# Patient Record
Sex: FEMALE | Race: WHITE | Employment: OTHER | URBAN - METROPOLITAN AREA
[De-identification: names, ages, dates, MRNs, and addresses within clinical notes are randomized per-mention and may not be internally consistent; named-entity substitution may affect disease eponyms.]

---

## 2017-06-06 ENCOUNTER — TELEPHONE (OUTPATIENT)
Dept: INTERNAL MEDICINE CLINIC | Facility: CLINIC | Age: 47
End: 2017-06-06

## 2017-06-06 DIAGNOSIS — D23.9 MULTIPLE DYSPLASTIC NEVI: Primary | ICD-10-CM

## 2017-06-06 NOTE — TELEPHONE ENCOUNTER
Patient now has a HMO & needs a referral to see Dr. Tabitha Clifton for her skin issues.   (The insurance is in the system, but card has not been scanned yet)

## 2017-06-06 NOTE — TELEPHONE ENCOUNTER
Pt states she is getting a skin check for dysplastic nevi, she had moles removes last year.  Pt states she has appt with Dr. Audrey Dang in August    To Dr. Vidal Damon, referral pending

## 2017-06-12 NOTE — TELEPHONE ENCOUNTER
Voicemail left informing patient that referral has been entered. VM left per pt preference as listed in Epic. Encouraged her to call back with any additional questions or concerns. Nothing further at this time.

## 2017-08-01 ENCOUNTER — OFFICE VISIT (OUTPATIENT)
Dept: DERMATOLOGY CLINIC | Facility: CLINIC | Age: 47
End: 2017-08-01

## 2017-08-01 DIAGNOSIS — D23.70 BENIGN NEOPLASM OF SKIN OF LOWER LIMB, INCLUDING HIP, UNSPECIFIED LATERALITY: ICD-10-CM

## 2017-08-01 DIAGNOSIS — L82.1 SEBORRHEIC KERATOSES: ICD-10-CM

## 2017-08-01 DIAGNOSIS — D23.5 BENIGN NEOPLASM OF SKIN OF TRUNK, EXCEPT SCROTUM: ICD-10-CM

## 2017-08-01 DIAGNOSIS — D23.4 BENIGN NEOPLASM OF SCALP AND SKIN OF NECK: ICD-10-CM

## 2017-08-01 DIAGNOSIS — L81.4 SOLAR LENTIGO: ICD-10-CM

## 2017-08-01 DIAGNOSIS — Z86.018 HISTORY OF DYSPLASTIC NEVUS: ICD-10-CM

## 2017-08-01 DIAGNOSIS — D48.5 NEOPLASM OF UNCERTAIN BEHAVIOR OF SKIN: Primary | ICD-10-CM

## 2017-08-01 DIAGNOSIS — D23.30 BENIGN NEOPLASM OF SKIN OF FACE: ICD-10-CM

## 2017-08-01 DIAGNOSIS — D23.60 BENIGN NEOPLASM OF SKIN OF UPPER LIMB, INCLUDING SHOULDER, UNSPECIFIED LATERALITY: ICD-10-CM

## 2017-08-01 PROCEDURE — 11101 BIOPSY, EACH ADDED LESION: CPT | Performed by: DERMATOLOGY

## 2017-08-01 PROCEDURE — 11100 BIOPSY OF SKIN LESION: CPT | Performed by: DERMATOLOGY

## 2017-08-01 PROCEDURE — 88305 TISSUE EXAM BY PATHOLOGIST: CPT | Performed by: DERMATOLOGY

## 2017-08-01 PROCEDURE — 99213 OFFICE O/P EST LOW 20 MIN: CPT | Performed by: DERMATOLOGY

## 2017-08-01 PROCEDURE — 99212 OFFICE O/P EST SF 10 MIN: CPT | Performed by: DERMATOLOGY

## 2017-08-01 NOTE — PROCEDURES
Procedural Report for Shave Biopsy    Procedure: With the patient is a l lateral decub position, the skin was scrubbed with alcohol. Anesthesia was obtained by injecting 2 mL of 1% Xylocaine with Epinephrine.   The skin surrounding the lession was place

## 2017-08-01 NOTE — PROGRESS NOTES
HPI:     Chief Complaint     Derm Problem        HPI     Derm Problem    Additional comments: Patient presents for full body check. LOV 3/3/2016. New lesions appearing. Denies bleeding. No personal hx of skin ca.  Father with hx of [address]       Last edited by name: N/A    Years of education: N/A  Number of children: N/A     Occupational History  None on file     Social History Main Topics   Smoking status: Never Smoker    Smokeless tobacco: Not on file    Alcohol use Yes    Comment: 2 drinks weekly    Drug use: are  performed- See the operative note. Postop instructions given. Further plan pending pathology. Monroe Omer History of dysplastic nevus-patient will continue  yearly exams.   Will come sooner if either of the above lesions are atypical  Solar lentigo-proper use

## 2017-11-07 ENCOUNTER — OFFICE VISIT (OUTPATIENT)
Dept: INTERNAL MEDICINE CLINIC | Facility: CLINIC | Age: 47
End: 2017-11-07

## 2017-11-07 VITALS
HEART RATE: 88 BPM | WEIGHT: 123.38 LBS | DIASTOLIC BLOOD PRESSURE: 76 MMHG | OXYGEN SATURATION: 99 % | HEIGHT: 62.5 IN | SYSTOLIC BLOOD PRESSURE: 110 MMHG | BODY MASS INDEX: 22.14 KG/M2 | TEMPERATURE: 98 F

## 2017-11-07 DIAGNOSIS — R53.83 OTHER FATIGUE: ICD-10-CM

## 2017-11-07 DIAGNOSIS — Z12.31 ENCOUNTER FOR SCREENING MAMMOGRAM FOR BREAST CANCER: ICD-10-CM

## 2017-11-07 DIAGNOSIS — E78.00 HYPERCHOLESTEROLEMIA: Primary | ICD-10-CM

## 2017-11-07 DIAGNOSIS — E55.9 VITAMIN D DEFICIENCY: ICD-10-CM

## 2017-11-07 DIAGNOSIS — R92.2 DENSE BREASTS: ICD-10-CM

## 2017-11-07 DIAGNOSIS — M85.80 OSTEOPENIA, UNSPECIFIED LOCATION: ICD-10-CM

## 2017-11-07 DIAGNOSIS — Z00.00 ROUTINE HEALTH MAINTENANCE: ICD-10-CM

## 2017-11-07 PROCEDURE — 90686 IIV4 VACC NO PRSV 0.5 ML IM: CPT | Performed by: INTERNAL MEDICINE

## 2017-11-07 PROCEDURE — 90471 IMMUNIZATION ADMIN: CPT | Performed by: INTERNAL MEDICINE

## 2017-11-07 PROCEDURE — 99396 PREV VISIT EST AGE 40-64: CPT | Performed by: INTERNAL MEDICINE

## 2017-11-07 NOTE — PROGRESS NOTES
Rogers Shown is a 50year old female. Patient presents with:  Physical: Patient presents for annual physical. Last pap done 10/2016. Last mammogram 12/2016.        HPI:   Rogers Shown is a 50year old female who presents for a complete physical exam.   F vomiting, diarrhea, constipation, hematochezia, or melena  NEURO: denies headaches or dizziness    EXAM:   /76 (BP Location: Left arm, Patient Position: Sitting, Cuff Size: adult)   Pulse 88   Temp 98.1 °F (36.7 °C) (Oral)   Ht 5' 2.5\" (1.588 m)   W

## 2017-11-17 ENCOUNTER — LAB ENCOUNTER (OUTPATIENT)
Dept: LAB | Age: 47
End: 2017-11-17
Attending: INTERNAL MEDICINE
Payer: COMMERCIAL

## 2017-11-17 DIAGNOSIS — E78.00 HYPERCHOLESTEROLEMIA: ICD-10-CM

## 2017-11-17 DIAGNOSIS — R53.83 OTHER FATIGUE: ICD-10-CM

## 2017-11-17 DIAGNOSIS — E55.9 VITAMIN D DEFICIENCY: ICD-10-CM

## 2017-11-17 PROCEDURE — 82306 VITAMIN D 25 HYDROXY: CPT

## 2017-11-17 PROCEDURE — 80061 LIPID PANEL: CPT

## 2017-11-17 PROCEDURE — 85025 COMPLETE CBC W/AUTO DIFF WBC: CPT

## 2017-11-17 PROCEDURE — 80053 COMPREHEN METABOLIC PANEL: CPT

## 2017-11-17 PROCEDURE — 36415 COLL VENOUS BLD VENIPUNCTURE: CPT

## 2017-11-17 PROCEDURE — 84443 ASSAY THYROID STIM HORMONE: CPT

## 2017-12-20 ENCOUNTER — HOSPITAL ENCOUNTER (OUTPATIENT)
Dept: MAMMOGRAPHY | Age: 47
Discharge: HOME | End: 2017-12-20
Attending: INTERNAL MEDICINE
Payer: COMMERCIAL

## 2017-12-20 ENCOUNTER — HOSPITAL ENCOUNTER (OUTPATIENT)
Dept: BONE DENSITY | Age: 47
Discharge: HOME | End: 2017-12-20
Attending: INTERNAL MEDICINE
Payer: COMMERCIAL

## 2017-12-20 DIAGNOSIS — M85.80 OSTEOPENIA, UNSPECIFIED LOCATION: ICD-10-CM

## 2017-12-20 DIAGNOSIS — R92.2 DENSE BREASTS: ICD-10-CM

## 2017-12-20 DIAGNOSIS — Z12.31 ENCOUNTER FOR SCREENING MAMMOGRAM FOR BREAST CANCER: ICD-10-CM

## 2017-12-20 PROCEDURE — 77067 SCR MAMMO BI INCL CAD: CPT | Performed by: INTERNAL MEDICINE

## 2017-12-20 PROCEDURE — 77063 BREAST TOMOSYNTHESIS BI: CPT | Performed by: INTERNAL MEDICINE

## 2017-12-20 PROCEDURE — 77080 DXA BONE DENSITY AXIAL: CPT | Performed by: INTERNAL MEDICINE

## 2017-12-26 ENCOUNTER — TELEPHONE (OUTPATIENT)
Dept: INTERNAL MEDICINE CLINIC | Facility: CLINIC | Age: 47
End: 2017-12-26

## 2017-12-26 NOTE — TELEPHONE ENCOUNTER
I don't think she needs an actual referral.  I ordered the additional mammo so that should be all she needs

## 2017-12-26 NOTE — TELEPHONE ENCOUNTER
Spoke with Evan Elliott in [facility]. She reports patient does not need a referral for additional views with mammo. She states she just needs an order and this is usually covered under the original mammo order. Patient notified. She verbalized understanding.

## 2017-12-26 NOTE — TELEPHONE ENCOUNTER
Pt had a mammogram on 12/20 she now needs a diagnostic mammogram which is scheduled 12/28, pt said she spoke to her Ins today she needs a referral she has [facility]     Please call pt when complete [phone]   Tasked to nursing high

## 2017-12-26 NOTE — TELEPHONE ENCOUNTER
PT is calling back she called her Ins again she needs a written referral & to include U/S to cover all bases.     Tasked to nursing

## 2017-12-28 ENCOUNTER — HOSPITAL ENCOUNTER (OUTPATIENT)
Dept: MAMMOGRAPHY | Facility: HOSPITAL | Age: 47
Discharge: HOME | End: 2017-12-28
Attending: INTERNAL MEDICINE
Payer: COMMERCIAL

## 2017-12-28 ENCOUNTER — HOSPITAL ENCOUNTER (OUTPATIENT)
Dept: ULTRASOUND IMAGING | Facility: HOSPITAL | Age: 47
Discharge: HOME | End: 2017-12-28
Attending: INTERNAL MEDICINE
Payer: COMMERCIAL

## 2017-12-28 DIAGNOSIS — R92.8 ABNORMAL MAMMOGRAM: ICD-10-CM

## 2017-12-28 PROCEDURE — 77065 DX MAMMO INCL CAD UNI: CPT | Performed by: INTERNAL MEDICINE

## 2017-12-28 PROCEDURE — 77061 BREAST TOMOSYNTHESIS UNI: CPT | Performed by: INTERNAL MEDICINE

## 2017-12-28 PROCEDURE — 76642 ULTRASOUND BREAST LIMITED: CPT | Performed by: INTERNAL MEDICINE

## 2018-07-23 ENCOUNTER — PATIENT MESSAGE (OUTPATIENT)
Dept: INTERNAL MEDICINE CLINIC | Facility: CLINIC | Age: 48
End: 2018-07-23

## 2018-07-23 DIAGNOSIS — D23.9 DYSPLASTIC NEVUS: Primary | ICD-10-CM

## 2018-07-23 NOTE — TELEPHONE ENCOUNTER
From: Lorenza Ceballos  To: Keron Vazquez MD  Sent: 2018 2:31 PM CDT  Subject: Referral Request    Hi Dr. Andreina Henderson,  I was not able to schedule my annual dermatology skin check with Dr. Zeb Khan before your referral .  I think I need that to s

## 2018-11-13 ENCOUNTER — OFFICE VISIT (OUTPATIENT)
Dept: INTERNAL MEDICINE CLINIC | Facility: CLINIC | Age: 48
End: 2018-11-13
Payer: COMMERCIAL

## 2018-11-13 VITALS
TEMPERATURE: 98 F | WEIGHT: 121 LBS | BODY MASS INDEX: 21.71 KG/M2 | RESPIRATION RATE: 18 BRPM | HEIGHT: 62.5 IN | OXYGEN SATURATION: 99 % | DIASTOLIC BLOOD PRESSURE: 62 MMHG | HEART RATE: 78 BPM | SYSTOLIC BLOOD PRESSURE: 102 MMHG

## 2018-11-13 DIAGNOSIS — M85.80 OSTEOPENIA, UNSPECIFIED LOCATION: ICD-10-CM

## 2018-11-13 DIAGNOSIS — Z12.31 ENCOUNTER FOR SCREENING MAMMOGRAM FOR BREAST CANCER: ICD-10-CM

## 2018-11-13 DIAGNOSIS — Z12.83 SCREENING EXAM FOR SKIN CANCER: ICD-10-CM

## 2018-11-13 DIAGNOSIS — Z00.00 ROUTINE HEALTH MAINTENANCE: ICD-10-CM

## 2018-11-13 DIAGNOSIS — R92.2 DENSE BREASTS: ICD-10-CM

## 2018-11-13 DIAGNOSIS — E78.00 HYPERCHOLESTEROLEMIA: Primary | ICD-10-CM

## 2018-11-13 DIAGNOSIS — R53.83 OTHER FATIGUE: ICD-10-CM

## 2018-11-13 PROCEDURE — 90471 IMMUNIZATION ADMIN: CPT | Performed by: INTERNAL MEDICINE

## 2018-11-13 PROCEDURE — 99396 PREV VISIT EST AGE 40-64: CPT | Performed by: INTERNAL MEDICINE

## 2018-11-13 PROCEDURE — 90686 IIV4 VACC NO PRSV 0.5 ML IM: CPT | Performed by: INTERNAL MEDICINE

## 2018-11-13 NOTE — PROGRESS NOTES
Gianluca Dotson is a 46year old female. Patient presents with: Annual: no pap, no complaints      HPI:   Gianluca Dotson is a 46year old female who presents for a complete physical exam.   Feels well. Exercises 4x/week. Does TRX, body pump.   Sons are 16 or dizziness    EXAM:   /62   Pulse 78   Temp 97.6 °F (36.4 °C) (Oral)   Resp 18   Ht 5' 2.5\" (1.588 m)   Wt 121 lb (54.9 kg)   LMP 05/27/2018   SpO2 99%   BMI 21.78 kg/m²     GENERAL: well developed, well nourished, in no apparent distress  HEENT:

## 2019-11-12 ENCOUNTER — OFFICE VISIT (OUTPATIENT)
Dept: INTERNAL MEDICINE CLINIC | Facility: CLINIC | Age: 49
End: 2019-11-12
Payer: COMMERCIAL

## 2019-11-12 VITALS
HEIGHT: 62.7 IN | DIASTOLIC BLOOD PRESSURE: 68 MMHG | OXYGEN SATURATION: 100 % | BODY MASS INDEX: 22.39 KG/M2 | TEMPERATURE: 98 F | SYSTOLIC BLOOD PRESSURE: 122 MMHG | WEIGHT: 124.81 LBS | HEART RATE: 70 BPM

## 2019-11-12 DIAGNOSIS — R92.2 DENSE BREASTS: ICD-10-CM

## 2019-11-12 DIAGNOSIS — E78.00 HYPERCHOLESTEROLEMIA: ICD-10-CM

## 2019-11-12 DIAGNOSIS — R53.83 OTHER FATIGUE: ICD-10-CM

## 2019-11-12 DIAGNOSIS — E55.9 VITAMIN D DEFICIENCY: ICD-10-CM

## 2019-11-12 DIAGNOSIS — Z12.31 ENCOUNTER FOR SCREENING MAMMOGRAM FOR BREAST CANCER: Primary | ICD-10-CM

## 2019-11-12 DIAGNOSIS — Z00.00 ROUTINE HEALTH MAINTENANCE: ICD-10-CM

## 2019-11-12 DIAGNOSIS — M85.80 OSTEOPENIA, UNSPECIFIED LOCATION: ICD-10-CM

## 2019-11-12 PROCEDURE — 90686 IIV4 VACC NO PRSV 0.5 ML IM: CPT | Performed by: INTERNAL MEDICINE

## 2019-11-12 PROCEDURE — 90471 IMMUNIZATION ADMIN: CPT | Performed by: INTERNAL MEDICINE

## 2019-11-12 PROCEDURE — 99396 PREV VISIT EST AGE 40-64: CPT | Performed by: INTERNAL MEDICINE

## 2019-11-12 NOTE — PROGRESS NOTES
Lupis Murray is a 44year old female. Patient presents with:  Physical: Feels well. Flu shot given. HPI:   Lupis Murray is a 44year old female who presents for a complete physical exam.   Feels well. Exercises 4x/week. Does yoga, body pump.   So vomiting, diarrhea, constipation, hematochezia, or melena  NEURO: denies headaches or dizziness    EXAM:   /68 (BP Location: Left arm, Patient Position: Sitting, Cuff Size: adult)   Pulse 70   Temp 97.6 °F (36.4 °C) (Oral)   Ht 5' 2.7\" (1.593 m)   W

## 2020-06-05 ENCOUNTER — PATIENT MESSAGE (OUTPATIENT)
Dept: INTERNAL MEDICINE CLINIC | Facility: CLINIC | Age: 50
End: 2020-06-05

## 2020-06-05 DIAGNOSIS — Z12.39 SCREENING FOR BREAST CANCER: Primary | ICD-10-CM

## 2020-06-05 NOTE — TELEPHONE ENCOUNTER
Mammogram order entered per protocol .  [facility] message sent to patient

## 2020-06-05 NOTE — TELEPHONE ENCOUNTER
From: Paulina Offer  To: Sunny Licea MD  Sent: 6/5/2020 11:06 AM CDT  Subject: Referral Request    Do I need an updated referral/order for a mammogram screening?  I have a mammogram appointment at the [facility] on July 15th and they asked if I

## 2020-06-08 ENCOUNTER — TELEPHONE (OUTPATIENT)
Dept: DERMATOLOGY CLINIC | Facility: CLINIC | Age: 50
End: 2020-06-08

## 2020-06-08 NOTE — TELEPHONE ENCOUNTER
Patient asking to be seen sooner than 6/29. Appt is for full body check. Patient asking to be seen sooner for a spot of concern.  Please advsie

## 2020-06-08 NOTE — TELEPHONE ENCOUNTER
LOV 2017 - Pt states a mole has been changing in color (black spots in middle) and now getting raised and thicker. Pt states she picked off the top of it and it is now scabbing. Pt asking to get in sooner.   Pt added to LSS' schedule on 6/9/2020 at 8am.

## 2020-06-09 ENCOUNTER — OFFICE VISIT (OUTPATIENT)
Dept: DERMATOLOGY CLINIC | Facility: CLINIC | Age: 50
End: 2020-06-09
Payer: COMMERCIAL

## 2020-06-09 VITALS — WEIGHT: 120 LBS | BODY MASS INDEX: 21.26 KG/M2 | HEIGHT: 63 IN

## 2020-06-09 DIAGNOSIS — D48.5 NEOPLASM OF UNCERTAIN BEHAVIOR OF SKIN: Primary | ICD-10-CM

## 2020-06-09 PROCEDURE — 11104 PUNCH BX SKIN SINGLE LESION: CPT | Performed by: DERMATOLOGY

## 2020-06-09 PROCEDURE — 88305 TISSUE EXAM BY PATHOLOGIST: CPT | Performed by: DERMATOLOGY

## 2020-06-09 PROCEDURE — 99212 OFFICE O/P EST SF 10 MIN: CPT | Performed by: DERMATOLOGY

## 2020-06-09 NOTE — PROGRESS NOTES
HPI:     Chief Complaint     Moles        HPI     Moles      Additional comments: changing nevus on right upper thigh ,denies itch ,bleed ,or pain ,no personal HX ,father has a HX of [address]          Last edited by Roman Richardson [address] [place] [address] Ave on 6/9/2020  8:04 AM. (H Non-medical: Not on file    Tobacco Use      Smoking status: Never Smoker      Smokeless tobacco: Never Used    Substance and Sexual Activity      Alcohol use:  Yes        Alcohol/week: 2.0 standard drinks        Types: 2 Standard drinks or equivalent p alert and oriented and appears their stated age. They are well-nourished. Exam is remarkable for the following-  1. At this point there is just a very small scab on right thigh. Minimal surrounding erythema. No appreciable pattern on dermoscopy.   2.

## 2020-06-09 NOTE — PROCEDURES
Procedural Report for Punch Biopsy    Procedure: With the patient is a supine position, the skin was scrubbed with alcohol. Anesthesia was obtained by injecting 2 mL of 1% Xylocaine with Epinephrine. A circular punch, 5 mm.  In size was used to incise

## 2020-06-18 ENCOUNTER — NURSE ONLY (OUTPATIENT)
Dept: DERMATOLOGY CLINIC | Facility: CLINIC | Age: 50
End: 2020-06-18
Payer: COMMERCIAL

## 2020-06-18 DIAGNOSIS — Z48.02 VISIT FOR SUTURE REMOVAL: Primary | ICD-10-CM

## 2020-06-18 NOTE — PROGRESS NOTES
HPI:     Chief Complaint     Suture Removal        HPI     Suture Removal      Additional comments: pt is here for stitch removal on RT leg, no personal HX of SC, father HX of [address]           Last edited by Aaron Turcios [address] [place] [address] Miryam on 6/18/2020 10:29 AM. (History) Standard drinks or equivalent per week        Comment: 2 drinks weekly      Drug use: No      Sexual activity: Not on file    Lifestyle      Physical activity:        Days per week: Not on file        Minutes per session: Not on file      Stress: Not on fi approximated. ASSESSMENT/PLAN:   Visit for suture removal  (primary encounter diagnosis) diagnosis discussed. Benign nature discussed. Suture removed Steri-Strips applied.   Patient will follow-up for her regularly scheduled skin check in a couple we

## 2020-06-29 ENCOUNTER — OFFICE VISIT (OUTPATIENT)
Dept: DERMATOLOGY CLINIC | Facility: CLINIC | Age: 50
End: 2020-06-29
Payer: COMMERCIAL

## 2020-06-29 DIAGNOSIS — D23.5 BENIGN NEOPLASM OF SKIN OF TRUNK, EXCEPT SCROTUM: ICD-10-CM

## 2020-06-29 DIAGNOSIS — Z86.018 HISTORY OF DYSPLASTIC NEVUS: Primary | ICD-10-CM

## 2020-06-29 DIAGNOSIS — D23.70 BENIGN NEOPLASM OF SKIN OF LOWER EXTREMITY, INCLUDING HIP, UNSPECIFIED LATERALITY: ICD-10-CM

## 2020-06-29 DIAGNOSIS — D23.4 BENIGN NEOPLASM OF SCALP AND SKIN OF NECK: ICD-10-CM

## 2020-06-29 DIAGNOSIS — L82.1 SEBORRHEIC KERATOSES: ICD-10-CM

## 2020-06-29 DIAGNOSIS — L81.4 SOLAR LENTIGO: ICD-10-CM

## 2020-06-29 DIAGNOSIS — D23.30 BENIGN NEOPLASM OF SKIN OF FACE: ICD-10-CM

## 2020-06-29 DIAGNOSIS — D23.60 BENIGN NEOPLASM OF SKIN OF UPPER EXTREMITY AND SHOULDER, UNSPECIFIED LATERALITY: ICD-10-CM

## 2020-06-29 PROCEDURE — 99213 OFFICE O/P EST LOW 20 MIN: CPT | Performed by: DERMATOLOGY

## 2020-06-29 NOTE — PROGRESS NOTES
HPI:     Chief Complaint     Full Skin Exam        HPI     Full Skin Exam      Additional comments: LOV 6/9/2020 - Pt presenting for full body skin exam.  Pt denies personal hx of skin cancer. Pt denies family hx of skin cancer.           Last edited by Mitch Kanner dysplasia   • Elevated LDL cholesterol level      History reviewed. No pertinent surgical history.   Social History    Socioeconomic History      Marital status:       Spouse name: Not on file      Number of children: Not on file      Years of educat Exercise: Not Asked        Bike Helmet: Not Asked        Seat Belt: Not Asked        Self-Exams: Not Asked        Grew up on a farm: Not Asked        History of tanning: Not Asked        Outdoor occupation: Not Asked        Pt has a pacemaker: No hours.     Benign neoplasm of skin of face  Benign neoplasm of scalp and skin of neck  Benign neoplasm of skin of trunk, except scrotum  Benign neoplasm of skin of upper extremity and shoulder, unspecified laterality  Benign neoplasm of skin of lower extrem

## 2020-07-15 ENCOUNTER — HOSPITAL ENCOUNTER (OUTPATIENT)
Dept: MAMMOGRAPHY | Facility: HOSPITAL | Age: 50
Discharge: HOME | End: 2020-07-15
Attending: INTERNAL MEDICINE
Payer: COMMERCIAL

## 2020-07-15 DIAGNOSIS — R92.2 DENSE BREASTS: ICD-10-CM

## 2020-07-15 DIAGNOSIS — Z12.31 ENCOUNTER FOR SCREENING MAMMOGRAM FOR BREAST CANCER: ICD-10-CM

## 2020-07-15 PROCEDURE — 77063 BREAST TOMOSYNTHESIS BI: CPT | Performed by: INTERNAL MEDICINE

## 2020-07-15 PROCEDURE — 77067 SCR MAMMO BI INCL CAD: CPT | Performed by: INTERNAL MEDICINE

## 2021-04-08 ENCOUNTER — OFFICE VISIT (OUTPATIENT)
Dept: INTERNAL MEDICINE CLINIC | Facility: CLINIC | Age: 51
End: 2021-04-08
Payer: COMMERCIAL

## 2021-04-08 VITALS
HEART RATE: 76 BPM | TEMPERATURE: 98 F | BODY MASS INDEX: 21.26 KG/M2 | HEIGHT: 63 IN | WEIGHT: 120 LBS | DIASTOLIC BLOOD PRESSURE: 78 MMHG | SYSTOLIC BLOOD PRESSURE: 112 MMHG | OXYGEN SATURATION: 98 %

## 2021-04-08 DIAGNOSIS — Z00.00 ROUTINE HEALTH MAINTENANCE: ICD-10-CM

## 2021-04-08 DIAGNOSIS — Z12.4 CERVICAL CANCER SCREENING: ICD-10-CM

## 2021-04-08 DIAGNOSIS — E55.9 VITAMIN D DEFICIENCY: ICD-10-CM

## 2021-04-08 DIAGNOSIS — R53.83 OTHER FATIGUE: ICD-10-CM

## 2021-04-08 DIAGNOSIS — M85.80 OSTEOPENIA, UNSPECIFIED LOCATION: ICD-10-CM

## 2021-04-08 DIAGNOSIS — Z12.31 ENCOUNTER FOR SCREENING MAMMOGRAM FOR MALIGNANT NEOPLASM OF BREAST: Primary | ICD-10-CM

## 2021-04-08 DIAGNOSIS — E78.00 HYPERCHOLESTEROLEMIA: ICD-10-CM

## 2021-04-08 DIAGNOSIS — Z78.0 POSTMENOPAUSE: ICD-10-CM

## 2021-04-08 PROCEDURE — 3008F BODY MASS INDEX DOCD: CPT | Performed by: INTERNAL MEDICINE

## 2021-04-08 PROCEDURE — 99396 PREV VISIT EST AGE 40-64: CPT | Performed by: INTERNAL MEDICINE

## 2021-04-08 PROCEDURE — 3074F SYST BP LT 130 MM HG: CPT | Performed by: INTERNAL MEDICINE

## 2021-04-08 PROCEDURE — 3078F DIAST BP <80 MM HG: CPT | Performed by: INTERNAL MEDICINE

## 2021-04-08 RX ORDER — FLUTICASONE PROPIONATE 50 MCG
2 SPRAY, SUSPENSION (ML) NASAL DAILY
COMMUNITY
End: 2022-01-17 | Stop reason: ALTCHOICE

## 2021-04-08 NOTE — PROGRESS NOTES
Zia Shah is a 44year old female. Patient presents with:  Physical: annual physical exam, requesting pap-last done 2016      HPI:   Zia Shah is a 44year old female who presents for a complete physical exam.       Had Covid (Portillo Dunham) vaccine 2 we Family History   Problem Relation Age of Onset   • Ovarian Cancer Paternal Grandmother 72   • Arrhythmia Son       Social History:   Social History    Tobacco Use      Smoking status: Never Smoker      Smokeless tobacco: Never Used    Vaping Use      Vap Colonoscopy 7/6/20 (Dr. Ramiro Gregorio); no polyps; repeat in 10 years (2030). Had Shingrix shingles vaccine.       Osteopenia  DEXA done 12/2017 -- stable, mild osteopenia. Cont exercise, cont calcium and vit D.  Check repeat (did not do when ordered in 2019)

## 2021-08-20 ENCOUNTER — TELEPHONE (OUTPATIENT)
Dept: INTERNAL MEDICINE CLINIC | Facility: CLINIC | Age: 51
End: 2021-08-20

## 2021-08-20 ENCOUNTER — OFFICE VISIT (OUTPATIENT)
Dept: INTERNAL MEDICINE CLINIC | Facility: CLINIC | Age: 51
End: 2021-08-20
Payer: COMMERCIAL

## 2021-08-20 VITALS
TEMPERATURE: 98 F | OXYGEN SATURATION: 98 % | HEART RATE: 59 BPM | BODY MASS INDEX: 22.26 KG/M2 | RESPIRATION RATE: 12 BRPM | WEIGHT: 121 LBS | HEIGHT: 62 IN | SYSTOLIC BLOOD PRESSURE: 116 MMHG | DIASTOLIC BLOOD PRESSURE: 58 MMHG

## 2021-08-20 DIAGNOSIS — L02.01 FACIAL ABSCESS: ICD-10-CM

## 2021-08-20 DIAGNOSIS — L03.211 FACIAL CELLULITIS: Primary | ICD-10-CM

## 2021-08-20 PROCEDURE — 3008F BODY MASS INDEX DOCD: CPT | Performed by: INTERNAL MEDICINE

## 2021-08-20 PROCEDURE — 3078F DIAST BP <80 MM HG: CPT | Performed by: INTERNAL MEDICINE

## 2021-08-20 PROCEDURE — 3074F SYST BP LT 130 MM HG: CPT | Performed by: INTERNAL MEDICINE

## 2021-08-20 PROCEDURE — 99214 OFFICE O/P EST MOD 30 MIN: CPT | Performed by: INTERNAL MEDICINE

## 2021-08-20 RX ORDER — CEFUROXIME AXETIL 500 MG/1
500 TABLET ORAL 2 TIMES DAILY
Qty: 14 TABLET | Refills: 0 | Status: SHIPPED | OUTPATIENT
Start: 2021-08-20 | End: 2022-01-17 | Stop reason: ALTCHOICE

## 2021-08-20 NOTE — PROGRESS NOTES
HPI:   Zia Shah is a 46year old female who presents for complains of: Patient presents with:  Eyelid Swelling: Lt Eye edema and redness, 2 weeks ago, noted it and then mechanically manipulated then progressed to soreness.   does not effect or obstruc

## 2021-08-20 NOTE — PATIENT INSTRUCTIONS
1. Facial cellulitis  Lets try to use the warm compress tonight for a solid 30 to 45 minutes as discussed  Start the antibiotics when you get them this afternoon, try to take both doses in 1 given day, should be split by at least 6 hours take with food if

## 2021-08-20 NOTE — TELEPHONE ENCOUNTER
Spoke to pt who reports red bump un left eyebrow noted a couple weeks ago. States she tried to pop it yesterday as she thought it was a pimple and this AM noted that redness has increased, became itchy and fluid filled.  Reports swelling is now noted by kellie

## 2021-08-20 NOTE — TELEPHONE ENCOUNTER
Patient developed a hard lump on her eyelid a couple weeks ago    She thought it was a pimple so she poked it and it became red and inflamed. A couple days later it started to fill with fluid around the eyelid, the lump is tender.     Please call patient 6

## 2021-10-05 ENCOUNTER — OFFICE VISIT (OUTPATIENT)
Dept: DERMATOLOGY CLINIC | Facility: CLINIC | Age: 51
End: 2021-10-05
Payer: COMMERCIAL

## 2021-10-05 DIAGNOSIS — L81.4 SOLAR LENTIGO: ICD-10-CM

## 2021-10-05 DIAGNOSIS — H02.823 MILIA OF EYELID OF RIGHT EYE: ICD-10-CM

## 2021-10-05 DIAGNOSIS — Z12.83 SKIN CANCER SCREENING: Primary | ICD-10-CM

## 2021-10-05 DIAGNOSIS — D22.9 MULTIPLE MELANOCYTIC NEVI: ICD-10-CM

## 2021-10-05 DIAGNOSIS — L82.1 SEBORRHEIC KERATOSES: ICD-10-CM

## 2021-10-05 DIAGNOSIS — D48.5 NEOPLASM OF UNCERTAIN BEHAVIOR OF SKIN: ICD-10-CM

## 2021-10-05 DIAGNOSIS — Z86.018 HISTORY OF DYSPLASTIC NEVUS: ICD-10-CM

## 2021-10-05 PROCEDURE — 88305 TISSUE EXAM BY PATHOLOGIST: CPT | Performed by: DERMATOLOGY

## 2021-10-05 PROCEDURE — 99396 PREV VISIT EST AGE 40-64: CPT | Performed by: DERMATOLOGY

## 2021-10-05 PROCEDURE — 11102 TANGNTL BX SKIN SINGLE LES: CPT | Performed by: DERMATOLOGY

## 2021-10-05 NOTE — PROGRESS NOTES
HPI:     Chief Complaint     Full Skin Exam        HPI     Full Skin Exam      Additional comments: pt presents for full body exam, father HX of SCC, denies personal HX of skin cancer LOV 6/29/2020          Last edited by Alirio Fish, Oni Castillo on 10/5/2021 1 mild to moderated dysplasia   • Dysplastic nevus 2016    left abdomen, mild to moderate dysplasia   • Elevated LDL cholesterol level      Past Surgical History:   Procedure Laterality Date   • COLONOSCOPY  07/2020    normal     Social History    [place] (Medical): Not on file      Lack of Transportation (Non-Medical):  Not on file  Physical Activity:       Days of Exercise per Week: Not on file      Minutes of Exercise per Session: Not on file  Stress:       Feeling of Stress : Not on file  Social [employer] questioned about this patient states that she thinks it is possibly recently changed.  -there are scattered blotchy brown macules on face, trunk and extremities  - there there is a firm white papule along the right lash line.   Also small inflammatory lesio Tissue      Results From Past 48 Hours:  No results found for this or any previous visit (from the past 48 hour(s)). Meds This Visit:      Imaging Orders:  None     Referral Orders:  No orders of the defined types were placed in this encounter.         1

## 2021-10-11 ENCOUNTER — TELEPHONE (OUTPATIENT)
Dept: DERMATOLOGY CLINIC | Facility: CLINIC | Age: 51
End: 2021-10-11

## 2021-10-11 NOTE — PROGRESS NOTES
Pt called office and left msg for staff to call her back with results and leave detailed msg.  I therefore left detailed msg with her test results and all below LSS\" recommendations

## 2022-01-03 LAB — AMB EXT COVID-19 RESULT: DETECTED

## 2022-01-17 ENCOUNTER — TELEPHONE (OUTPATIENT)
Dept: INTERNAL MEDICINE CLINIC | Facility: CLINIC | Age: 52
End: 2022-01-17

## 2022-01-17 ENCOUNTER — OFFICE VISIT (OUTPATIENT)
Dept: INTERNAL MEDICINE CLINIC | Facility: CLINIC | Age: 52
End: 2022-01-17
Payer: COMMERCIAL

## 2022-01-17 VITALS
SYSTOLIC BLOOD PRESSURE: 136 MMHG | HEART RATE: 79 BPM | BODY MASS INDEX: 22.44 KG/M2 | HEIGHT: 63 IN | WEIGHT: 126.63 LBS | DIASTOLIC BLOOD PRESSURE: 89 MMHG

## 2022-01-17 DIAGNOSIS — R05.9 COUGH: Primary | ICD-10-CM

## 2022-01-17 DIAGNOSIS — U07.1 ACUTE COVID-19: ICD-10-CM

## 2022-01-17 PROCEDURE — 99213 OFFICE O/P EST LOW 20 MIN: CPT | Performed by: INTERNAL MEDICINE

## 2022-01-17 PROCEDURE — 3079F DIAST BP 80-89 MM HG: CPT | Performed by: INTERNAL MEDICINE

## 2022-01-17 PROCEDURE — 3075F SYST BP GE 130 - 139MM HG: CPT | Performed by: INTERNAL MEDICINE

## 2022-01-17 PROCEDURE — 3008F BODY MASS INDEX DOCD: CPT | Performed by: INTERNAL MEDICINE

## 2022-01-17 RX ORDER — BENZONATATE 100 MG/1
100 CAPSULE ORAL 3 TIMES DAILY PRN
Qty: 20 CAPSULE | Refills: 0 | Status: SHIPPED | OUTPATIENT
Start: 2022-01-17

## 2022-01-17 RX ORDER — ALBUTEROL SULFATE 90 UG/1
2 AEROSOL, METERED RESPIRATORY (INHALATION) EVERY 6 HOURS PRN
Qty: 1 EACH | Refills: 0 | Status: SHIPPED | OUTPATIENT
Start: 2022-01-17

## 2022-01-17 NOTE — PATIENT INSTRUCTIONS
Please use benzonatate 100 mg 3 times daily as needed for coughing. Use albuterol 2 puffs 4 times daily as needed.

## 2022-01-17 NOTE — TELEPHONE ENCOUNTER
Called patient who tested positive for covid 1/3/22 - still persistent cough, chest heaviness, denies SOB , denies fever - RN instructed patient to be evaluated in UC - probably chest x-ray - verbalized understanding F/U 1/18

## 2022-01-17 NOTE — PROGRESS NOTES
Isela Tse is a 46year old female. Patient presents with:  Cough    HPI:   Ms. Luis Alberto Finley presents this evening for evaluation of a persistent cough.     She developed symptoms of acute COVID infection on January 1 with subjective fever, chills, headache, dysplasia   • Elevated LDL cholesterol level      Past Surgical History:   Procedure Laterality Date   • COLONOSCOPY  07/2020    normal      Social History:  Social History    Tobacco Use      Smoking status: Never Smoker      Smokeless tobacco: Never Used

## 2022-01-17 NOTE — TELEPHONE ENCOUNTER
Please call pt  Hoping to be seen in the office  Pt had COVID two weeks ago, pt has recovered from all other symptoms except cough   Pt has cough that has gotten worse, pt concerned for bronchitis or pneumonia  Tasked to nursing

## 2022-01-19 ENCOUNTER — PATIENT MESSAGE (OUTPATIENT)
Dept: INTERNAL MEDICINE CLINIC | Facility: CLINIC | Age: 52
End: 2022-01-19

## 2022-01-19 ENCOUNTER — APPOINTMENT (OUTPATIENT)
Dept: GENERAL RADIOLOGY | Age: 52
End: 2022-01-19
Attending: EMERGENCY MEDICINE
Payer: COMMERCIAL

## 2022-01-19 ENCOUNTER — HOSPITAL ENCOUNTER (OUTPATIENT)
Age: 52
Discharge: HOME | End: 2022-01-19
Payer: COMMERCIAL

## 2022-01-19 VITALS
OXYGEN SATURATION: 100 % | RESPIRATION RATE: 18 BRPM | DIASTOLIC BLOOD PRESSURE: 78 MMHG | HEIGHT: 62 IN | SYSTOLIC BLOOD PRESSURE: 138 MMHG | HEART RATE: 85 BPM | TEMPERATURE: 98 F | WEIGHT: 122 LBS | BODY MASS INDEX: 22.45 KG/M2

## 2022-01-19 DIAGNOSIS — R05.9 COUGH: Primary | ICD-10-CM

## 2022-01-19 DIAGNOSIS — E55.9 VITAMIN D DEFICIENCY: ICD-10-CM

## 2022-01-19 DIAGNOSIS — Z00.00 ROUTINE HEALTH MAINTENANCE: ICD-10-CM

## 2022-01-19 DIAGNOSIS — J98.01 BRONCHOSPASM: ICD-10-CM

## 2022-01-19 DIAGNOSIS — E78.00 HYPERCHOLESTEROLEMIA: Primary | ICD-10-CM

## 2022-01-19 PROCEDURE — 71046 X-RAY EXAM CHEST 2 VIEWS: CPT | Performed by: EMERGENCY MEDICINE

## 2022-01-19 PROCEDURE — 99203 OFFICE O/P NEW LOW 30 MIN: CPT | Performed by: EMERGENCY MEDICINE

## 2022-01-19 RX ORDER — PREDNISONE 20 MG/1
TABLET ORAL
Qty: 10 TABLET | Refills: 0 | Status: SHIPPED | OUTPATIENT
Start: 2022-01-19 | End: 2022-01-26

## 2022-01-19 NOTE — TELEPHONE ENCOUNTER
From: Jet Chappell  To: Steffany Mcnulty MD  Sent: 1/19/2022 12:13 PM CST  Subject: Blood work    Hi Dr. Darcus Burkitt just scheduled an annual physical with you for this March.  I never did get my blood work done after last year's annual physical and th

## 2022-01-19 NOTE — ED PROVIDER NOTES
Patient Seen in: [facility]      History   Patient presents with:  Cough/URI    Stated Complaint: covid 1/3 - cough not going away    Subjective:   HPI    Shahid Pounds is a 46year old female here for URI sx since dx with COVID 1/3/2022.   Nadia Dejesus 2\")   Wt 55.3 kg   LMP 01/01/2019 (Approximate)   SpO2 100%   BMI 22.31 kg/m²         Physical Exam  Vitals and nursing note reviewed. Constitutional:       Appearance: Normal appearance. She is not ill-appearing. HENT:      Head: Normocephalic. of COVID-19 pneumonia at this time. 2. Moderate S-shaped thoracolumbar scoliosis.    elm-remote  Dictated by (CST): Sabrina Chery MD on 1/19/2022 at 8:50 AM     Finalized by (CST): Sabrina Chery MD on 1/19/2022 at 8:51 AM              Oral airways clear contraindication such as acute angle glaucoma, glaucoma, urinary retention, allergy, etc.   -Caution with NSAIDs and anti-depressants  -Avoid taking antacids with other medications wait at least one hour.  He will check the effectiveness of other medication

## 2022-01-19 NOTE — ED INITIAL ASSESSMENT (HPI)
Pt here with complaints of cough , pt states she tested +covid Harley 3 2022 and the cough has not resolved, pt states she was prescribed albuterol and benzonatate and has not helped her symptoms, pt is requesting CXR

## 2022-01-19 NOTE — TELEPHONE ENCOUNTER
To Dr. Sara Causey---    It appears that labs ordered 4/8/21 are still active. Would you like any additional labs prior to annual physical in march?

## 2022-03-07 ENCOUNTER — HOSPITAL ENCOUNTER (OUTPATIENT)
Dept: MAMMOGRAPHY | Age: 52
Discharge: HOME | End: 2022-03-07
Attending: INTERNAL MEDICINE
Payer: COMMERCIAL

## 2022-03-07 ENCOUNTER — HOSPITAL ENCOUNTER (OUTPATIENT)
Dept: BONE DENSITY | Age: 52
Discharge: HOME | End: 2022-03-07
Attending: INTERNAL MEDICINE
Payer: COMMERCIAL

## 2022-03-07 DIAGNOSIS — Z12.31 ENCOUNTER FOR SCREENING MAMMOGRAM FOR MALIGNANT NEOPLASM OF BREAST: ICD-10-CM

## 2022-03-07 DIAGNOSIS — Z78.0 POSTMENOPAUSE: ICD-10-CM

## 2022-03-07 DIAGNOSIS — M85.80 OSTEOPENIA, UNSPECIFIED LOCATION: ICD-10-CM

## 2022-03-07 PROCEDURE — 77067 SCR MAMMO BI INCL CAD: CPT | Performed by: INTERNAL MEDICINE

## 2022-03-07 PROCEDURE — 77080 DXA BONE DENSITY AXIAL: CPT | Performed by: INTERNAL MEDICINE

## 2022-03-07 PROCEDURE — 77063 BREAST TOMOSYNTHESIS BI: CPT | Performed by: INTERNAL MEDICINE

## 2022-03-29 ENCOUNTER — LAB ENCOUNTER (OUTPATIENT)
Dept: LAB | Age: 52
End: 2022-03-29
Attending: INTERNAL MEDICINE
Payer: COMMERCIAL

## 2022-03-29 DIAGNOSIS — E78.00 HYPERCHOLESTEROLEMIA: ICD-10-CM

## 2022-03-29 DIAGNOSIS — E55.9 VITAMIN D DEFICIENCY: ICD-10-CM

## 2022-03-29 DIAGNOSIS — Z00.00 ROUTINE HEALTH MAINTENANCE: ICD-10-CM

## 2022-03-29 LAB
ALBUMIN SERPL-MCNC: 3.7 G/DL (ref 3.4–5)
ALBUMIN/GLOB SERPL: 1.2 {RATIO} (ref 1–2)
ALP LIVER SERPL-CCNC: 62 U/L
ALT SERPL-CCNC: 27 U/L
ANION GAP SERPL CALC-SCNC: 6 MMOL/L (ref 0–18)
AST SERPL-CCNC: 34 U/L (ref 15–37)
BASOPHILS # BLD AUTO: 0.06 X10(3) UL (ref 0–0.2)
BASOPHILS NFR BLD AUTO: 1.1 %
BILIRUB SERPL-MCNC: 0.4 MG/DL (ref 0.1–2)
BUN BLD-MCNC: 20 MG/DL (ref 7–18)
BUN/CREAT SERPL: 18.9 (ref 10–20)
CALCIUM BLD-MCNC: 9.4 MG/DL (ref 8.5–10.1)
CHLORIDE SERPL-SCNC: 104 MMOL/L (ref 98–112)
CHOLEST SERPL-MCNC: 271 MG/DL (ref ?–200)
CO2 SERPL-SCNC: 29 MMOL/L (ref 21–32)
CREAT BLD-MCNC: 1.06 MG/DL
DEPRECATED RDW RBC AUTO: 43.7 FL (ref 35.1–46.3)
EOSINOPHIL # BLD AUTO: 0.14 X10(3) UL (ref 0–0.7)
EOSINOPHIL NFR BLD AUTO: 2.6 %
ERYTHROCYTE [DISTWIDTH] IN BLOOD BY AUTOMATED COUNT: 12.3 % (ref 11–15)
FASTING PATIENT LIPID ANSWER: YES
FASTING STATUS PATIENT QL REPORTED: YES
GLOBULIN PLAS-MCNC: 3 G/DL (ref 2.8–4.4)
GLUCOSE BLD-MCNC: 88 MG/DL (ref 70–99)
HCT VFR BLD AUTO: 42.9 %
HDLC SERPL-MCNC: 72 MG/DL (ref 40–59)
HGB BLD-MCNC: 13.7 G/DL
IMM GRANULOCYTES # BLD AUTO: 0.01 X10(3) UL (ref 0–1)
IMM GRANULOCYTES NFR BLD: 0.2 %
LDLC SERPL CALC-MCNC: 185 MG/DL (ref ?–100)
LYMPHOCYTES # BLD AUTO: 1.87 X10(3) UL (ref 1–4)
LYMPHOCYTES NFR BLD AUTO: 34.2 %
MCH RBC QN AUTO: 30.8 PG (ref 26–34)
MCHC RBC AUTO-ENTMCNC: 31.9 G/DL (ref 31–37)
MCV RBC AUTO: 96.4 FL
MONOCYTES # BLD AUTO: 0.38 X10(3) UL (ref 0.1–1)
MONOCYTES NFR BLD AUTO: 7 %
NEUTROPHILS # BLD AUTO: 3 X10 (3) UL (ref 1.5–7.7)
NEUTROPHILS # BLD AUTO: 3 X10(3) UL (ref 1.5–7.7)
NEUTROPHILS NFR BLD AUTO: 54.9 %
NONHDLC SERPL-MCNC: 199 MG/DL (ref ?–130)
OSMOLALITY SERPL CALC.SUM OF ELEC: 290 MOSM/KG (ref 275–295)
PLATELET # BLD AUTO: 275 10(3)UL (ref 150–450)
POTASSIUM SERPL-SCNC: 4.4 MMOL/L (ref 3.5–5.1)
PROT SERPL-MCNC: 6.7 G/DL (ref 6.4–8.2)
RBC # BLD AUTO: 4.45 X10(6)UL
SODIUM SERPL-SCNC: 139 MMOL/L (ref 136–145)
T4 FREE SERPL-MCNC: 0.9 NG/DL (ref 0.8–1.7)
TRIGL SERPL-MCNC: 82 MG/DL (ref 30–149)
TSI SER-ACNC: 4.26 MIU/ML (ref 0.36–3.74)
VIT D+METAB SERPL-MCNC: 32.8 NG/ML (ref 30–100)
VLDLC SERPL CALC-MCNC: 17 MG/DL (ref 0–30)
WBC # BLD AUTO: 5.5 X10(3) UL (ref 4–11)

## 2022-03-29 PROCEDURE — 80061 LIPID PANEL: CPT

## 2022-03-29 PROCEDURE — 36415 COLL VENOUS BLD VENIPUNCTURE: CPT

## 2022-03-29 PROCEDURE — 84439 ASSAY OF FREE THYROXINE: CPT | Performed by: INTERNAL MEDICINE

## 2022-03-29 PROCEDURE — 82306 VITAMIN D 25 HYDROXY: CPT

## 2022-03-29 PROCEDURE — 85025 COMPLETE CBC W/AUTO DIFF WBC: CPT

## 2022-03-29 PROCEDURE — 84443 ASSAY THYROID STIM HORMONE: CPT | Performed by: INTERNAL MEDICINE

## 2022-03-29 PROCEDURE — 80053 COMPREHEN METABOLIC PANEL: CPT

## 2022-04-09 ENCOUNTER — HOSPITAL ENCOUNTER (OUTPATIENT)
Dept: CT IMAGING | Age: 52
Discharge: HOME | End: 2022-04-09
Attending: INTERNAL MEDICINE

## 2022-04-09 DIAGNOSIS — Z13.6 SCREENING FOR HEART DISEASE: ICD-10-CM

## 2022-04-12 ENCOUNTER — OFFICE VISIT (OUTPATIENT)
Dept: INTERNAL MEDICINE CLINIC | Facility: CLINIC | Age: 52
End: 2022-04-12
Payer: COMMERCIAL

## 2022-04-12 VITALS
SYSTOLIC BLOOD PRESSURE: 122 MMHG | BODY MASS INDEX: 22.63 KG/M2 | HEIGHT: 62 IN | HEART RATE: 84 BPM | OXYGEN SATURATION: 99 % | WEIGHT: 123 LBS | TEMPERATURE: 99 F | DIASTOLIC BLOOD PRESSURE: 76 MMHG

## 2022-04-12 DIAGNOSIS — E55.9 VITAMIN D DEFICIENCY: ICD-10-CM

## 2022-04-12 DIAGNOSIS — E03.8 SUBCLINICAL HYPOTHYROIDISM: ICD-10-CM

## 2022-04-12 DIAGNOSIS — R79.89 ELEVATED SERUM CREATININE: ICD-10-CM

## 2022-04-12 DIAGNOSIS — M81.0 AGE-RELATED OSTEOPOROSIS WITHOUT CURRENT PATHOLOGICAL FRACTURE: ICD-10-CM

## 2022-04-12 DIAGNOSIS — E78.00 HYPERCHOLESTEROLEMIA: Primary | ICD-10-CM

## 2022-04-12 PROCEDURE — 3008F BODY MASS INDEX DOCD: CPT | Performed by: INTERNAL MEDICINE

## 2022-04-12 PROCEDURE — 3078F DIAST BP <80 MM HG: CPT | Performed by: INTERNAL MEDICINE

## 2022-04-12 PROCEDURE — 99396 PREV VISIT EST AGE 40-64: CPT | Performed by: INTERNAL MEDICINE

## 2022-04-12 PROCEDURE — 3074F SYST BP LT 130 MM HG: CPT | Performed by: INTERNAL MEDICINE

## 2022-07-11 ENCOUNTER — PATIENT MESSAGE (OUTPATIENT)
Dept: INTERNAL MEDICINE CLINIC | Facility: CLINIC | Age: 52
End: 2022-07-11

## 2022-07-11 DIAGNOSIS — Z80.9 FAMILY HISTORY OF CANCER: Primary | ICD-10-CM

## 2022-07-11 NOTE — TELEPHONE ENCOUNTER
From: Adela Swain  To: Ana Prakash MD  Sent: 7/11/2022 3:45 PM CDT  Subject: Genetic Testing Candidate? Hi Dr. Teo Ayala,  I'm wondering if I should pursue genetic testing. My brother was just diagnosed with prostate cancer. Our paternal grandmother had ovarian cancer, 2 of her sisters had colon cancer, and 1 had breast cancer. Our father was screened for the BRCA mutations, none were found. But, that wouldn't rule out Skinner Syndrome or other variants. Am I a good candidate? How would I go about this?   Clau Varner

## 2022-07-12 ENCOUNTER — PATIENT MESSAGE (OUTPATIENT)
Dept: INTERNAL MEDICINE CLINIC | Facility: CLINIC | Age: 52
End: 2022-07-12

## 2022-07-13 NOTE — TELEPHONE ENCOUNTER
From: Sussy Martínez  Sent: 7/12/2022 9:27 PM CDT  To: [facility] Clinical Staff  Subject: Genetic Testing Candidate? Will do. Thank you.

## 2022-07-20 ENCOUNTER — GENETICS ENCOUNTER (OUTPATIENT)
Dept: GENETICS | Facility: HOSPITAL | Age: 52
End: 2022-07-20
Attending: GENETIC COUNSELOR, MS
Payer: COMMERCIAL

## 2022-07-20 ENCOUNTER — APPOINTMENT (OUTPATIENT)
Dept: HEMATOLOGY/ONCOLOGY | Facility: HOSPITAL | Age: 52
End: 2022-07-20
Attending: GENETIC COUNSELOR, MS
Payer: COMMERCIAL

## 2022-07-20 DIAGNOSIS — Z80.42 FAMILY HISTORY OF MALIGNANT NEOPLASM OF PROSTATE: ICD-10-CM

## 2022-07-20 DIAGNOSIS — Z80.41 FAMILY HISTORY OF MALIGNANT NEOPLASM OF OVARY: Primary | ICD-10-CM

## 2022-07-20 PROCEDURE — 96040 HC GENETIC COUNSELING EA 30 MIN: CPT | Performed by: GENETIC COUNSELOR, MS

## 2022-10-10 ENCOUNTER — OFFICE VISIT (OUTPATIENT)
Dept: DERMATOLOGY CLINIC | Facility: CLINIC | Age: 52
End: 2022-10-10
Payer: COMMERCIAL

## 2022-10-10 DIAGNOSIS — D23.9 BENIGN NEOPLASM OF SKIN, UNSPECIFIED LOCATION: ICD-10-CM

## 2022-10-10 DIAGNOSIS — L81.4 SOLAR LENTIGO: ICD-10-CM

## 2022-10-10 DIAGNOSIS — Z12.83 SKIN CANCER SCREENING: Primary | ICD-10-CM

## 2022-10-10 DIAGNOSIS — Z86.018 HISTORY OF DYSPLASTIC NEVUS: ICD-10-CM

## 2022-10-10 DIAGNOSIS — L82.1 SEBORRHEIC KERATOSES: ICD-10-CM

## 2022-10-10 DIAGNOSIS — D22.9 MULTIPLE MELANOCYTIC NEVI: ICD-10-CM

## 2022-10-10 PROCEDURE — 99213 OFFICE O/P EST LOW 20 MIN: CPT | Performed by: DERMATOLOGY

## 2022-10-10 RX ORDER — BENZONATATE 100 MG/1
1 CAPSULE ORAL 3 TIMES DAILY PRN
COMMUNITY
Start: 2022-01-17

## 2022-10-10 RX ORDER — ALBUTEROL SULFATE 90 UG/1
2 AEROSOL, METERED RESPIRATORY (INHALATION) EVERY 6 HOURS PRN
COMMUNITY
Start: 2022-01-17

## 2023-04-06 ENCOUNTER — LAB ENCOUNTER (OUTPATIENT)
Dept: LAB | Age: 53
End: 2023-04-06
Attending: INTERNAL MEDICINE
Payer: COMMERCIAL

## 2023-04-06 DIAGNOSIS — E78.00 HYPERCHOLESTEROLEMIA: ICD-10-CM

## 2023-04-06 DIAGNOSIS — R79.89 ELEVATED SERUM CREATININE: ICD-10-CM

## 2023-04-06 DIAGNOSIS — E03.8 SUBCLINICAL HYPOTHYROIDISM: ICD-10-CM

## 2023-04-06 LAB
ALBUMIN SERPL-MCNC: 3.7 G/DL (ref 3.4–5)
ALBUMIN/GLOB SERPL: 1.2 {RATIO} (ref 1–2)
ALP LIVER SERPL-CCNC: 56 U/L
ALT SERPL-CCNC: 25 U/L
ANION GAP SERPL CALC-SCNC: 4 MMOL/L (ref 0–18)
AST SERPL-CCNC: 16 U/L (ref 15–37)
BILIRUB SERPL-MCNC: 0.5 MG/DL (ref 0.1–2)
BUN BLD-MCNC: 14 MG/DL (ref 7–18)
BUN/CREAT SERPL: 15.2 (ref 10–20)
CALCIUM BLD-MCNC: 9.2 MG/DL (ref 8.5–10.1)
CHLORIDE SERPL-SCNC: 101 MMOL/L (ref 98–112)
CHOLEST SERPL-MCNC: 260 MG/DL (ref ?–200)
CO2 SERPL-SCNC: 32 MMOL/L (ref 21–32)
CREAT BLD-MCNC: 0.92 MG/DL
FASTING PATIENT LIPID ANSWER: YES
FASTING STATUS PATIENT QL REPORTED: YES
GFR SERPLBLD BASED ON 1.73 SQ M-ARVRAT: 74 ML/MIN/1.73M2 (ref 60–?)
GLOBULIN PLAS-MCNC: 3.1 G/DL (ref 2.8–4.4)
GLUCOSE BLD-MCNC: 81 MG/DL (ref 70–99)
HDLC SERPL-MCNC: 79 MG/DL (ref 40–59)
LDLC SERPL CALC-MCNC: 170 MG/DL (ref ?–100)
NONHDLC SERPL-MCNC: 181 MG/DL (ref ?–130)
OSMOLALITY SERPL CALC.SUM OF ELEC: 284 MOSM/KG (ref 275–295)
POTASSIUM SERPL-SCNC: 3.5 MMOL/L (ref 3.5–5.1)
PROT SERPL-MCNC: 6.8 G/DL (ref 6.4–8.2)
SODIUM SERPL-SCNC: 137 MMOL/L (ref 136–145)
TRIGL SERPL-MCNC: 69 MG/DL (ref 30–149)
TSI SER-ACNC: 2.32 MIU/ML (ref 0.36–3.74)
VLDLC SERPL CALC-MCNC: 14 MG/DL (ref 0–30)

## 2023-04-06 PROCEDURE — 80061 LIPID PANEL: CPT

## 2023-04-06 PROCEDURE — 36415 COLL VENOUS BLD VENIPUNCTURE: CPT

## 2023-04-06 PROCEDURE — 84443 ASSAY THYROID STIM HORMONE: CPT

## 2023-04-06 PROCEDURE — 80053 COMPREHEN METABOLIC PANEL: CPT

## 2023-04-13 ENCOUNTER — OFFICE VISIT (OUTPATIENT)
Dept: INTERNAL MEDICINE CLINIC | Facility: CLINIC | Age: 53
End: 2023-04-13

## 2023-04-13 VITALS
OXYGEN SATURATION: 98 % | HEIGHT: 62.75 IN | RESPIRATION RATE: 16 BRPM | HEART RATE: 78 BPM | TEMPERATURE: 98 F | DIASTOLIC BLOOD PRESSURE: 56 MMHG | WEIGHT: 117 LBS | BODY MASS INDEX: 20.99 KG/M2 | SYSTOLIC BLOOD PRESSURE: 108 MMHG

## 2023-04-13 DIAGNOSIS — M81.0 AGE-RELATED OSTEOPOROSIS WITHOUT CURRENT PATHOLOGICAL FRACTURE: ICD-10-CM

## 2023-04-13 DIAGNOSIS — Z12.31 ENCOUNTER FOR SCREENING MAMMOGRAM FOR MALIGNANT NEOPLASM OF BREAST: ICD-10-CM

## 2023-04-13 DIAGNOSIS — Z00.00 ROUTINE HEALTH MAINTENANCE: ICD-10-CM

## 2023-04-13 DIAGNOSIS — E03.8 SUBCLINICAL HYPOTHYROIDISM: ICD-10-CM

## 2023-04-13 DIAGNOSIS — E78.00 HYPERCHOLESTEROLEMIA: Primary | ICD-10-CM

## 2023-04-13 PROCEDURE — 3008F BODY MASS INDEX DOCD: CPT | Performed by: INTERNAL MEDICINE

## 2023-04-13 PROCEDURE — 69210 REMOVE IMPACTED EAR WAX UNI: CPT | Performed by: INTERNAL MEDICINE

## 2023-04-13 PROCEDURE — 3078F DIAST BP <80 MM HG: CPT | Performed by: INTERNAL MEDICINE

## 2023-04-13 PROCEDURE — 99396 PREV VISIT EST AGE 40-64: CPT | Performed by: INTERNAL MEDICINE

## 2023-04-13 PROCEDURE — 3074F SYST BP LT 130 MM HG: CPT | Performed by: INTERNAL MEDICINE

## 2024-04-15 ENCOUNTER — PATIENT MESSAGE (OUTPATIENT)
Dept: INTERNAL MEDICINE CLINIC | Facility: CLINIC | Age: 54
End: 2024-04-15

## 2024-04-15 ENCOUNTER — OFFICE VISIT (OUTPATIENT)
Dept: INTERNAL MEDICINE CLINIC | Facility: CLINIC | Age: 54
End: 2024-04-15

## 2024-04-15 VITALS
WEIGHT: 118 LBS | HEIGHT: 62.5 IN | HEART RATE: 66 BPM | SYSTOLIC BLOOD PRESSURE: 128 MMHG | DIASTOLIC BLOOD PRESSURE: 76 MMHG | TEMPERATURE: 98 F | RESPIRATION RATE: 16 BRPM | BODY MASS INDEX: 21.17 KG/M2

## 2024-04-15 DIAGNOSIS — Z12.83 SKIN EXAM, SCREENING FOR CANCER: ICD-10-CM

## 2024-04-15 DIAGNOSIS — M81.0 AGE-RELATED OSTEOPOROSIS WITHOUT CURRENT PATHOLOGICAL FRACTURE: ICD-10-CM

## 2024-04-15 DIAGNOSIS — E55.9 VITAMIN D DEFICIENCY: ICD-10-CM

## 2024-04-15 DIAGNOSIS — Z00.00 ROUTINE HEALTH MAINTENANCE: ICD-10-CM

## 2024-04-15 DIAGNOSIS — Z80.41 FAMILY HISTORY OF MALIGNANT NEOPLASM OF OVARY: ICD-10-CM

## 2024-04-15 DIAGNOSIS — E03.8 SUBCLINICAL HYPOTHYROIDISM: ICD-10-CM

## 2024-04-15 DIAGNOSIS — Z78.0 POSTMENOPAUSE: Primary | ICD-10-CM

## 2024-04-15 DIAGNOSIS — E78.00 HYPERCHOLESTEROLEMIA: ICD-10-CM

## 2024-04-15 DIAGNOSIS — Z12.31 ENCOUNTER FOR SCREENING MAMMOGRAM FOR MALIGNANT NEOPLASM OF BREAST: ICD-10-CM

## 2024-04-15 NOTE — PROGRESS NOTES
Vianney Castle is a 53 year old female.  Chief Complaint   Patient presents with    Physical     AP, kelvin 3/7/22, Pap 21        HPI:   Vianney Castle is a 53 year old female who presents for a complete physical exam.     Exercises regularly  Just got back from skiing.        FamHx:  Brother dx'ed with prostate cancer at age 60 --genetic testing negative  Other brother with AF at 56  Other brother with MVP and CAD (heavy smoker, EtOH) -- survived the CABG but then  a few months later of hemorrhagic CVA  Dad had MI at 83 in 2023 (nonsmoker)-- survived cardiac arrest (exercises regularly)    SocHx:  Sons are 20 (mariana at [employer] studying [employer]) and 22 (sr at [employer]).      Tinnitus has resolved  Wt Readings from Last 6 Encounters:   04/15/24 118 lb (53.5 kg)   23 117 lb (53.1 kg)   22 123 lb (55.8 kg)   22 122 lb (55.3 kg)   22 126 lb 9.6 oz (57.4 kg)   21 121 lb (54.9 kg)     Body mass index is 21.24 kg/m².       Current Outpatient Medications   Medication Sig Dispense Refill    [facility] (BETA-SITOSTEROL PLANT STEROLS OR) Take 2 capsules by mouth 2 (two) times a day.      Tretinoin, Facial Wrinkles, (RENOVA) 0.02 % External Cream Apply to face nightly as tolerated 40 g 6    Calcium Carbonate-Vitamin D 500-400 MG-UNIT Oral Tab Take 1 tablet by mouth daily.      albuterol 108 (90 Base) MCG/ACT Inhalation Aero Soln Inhale 2 puffs into the lungs every 6 (six) hours as needed. (Patient not taking: Reported on 10/10/2022)      benzonatate 100 MG Oral Cap Take 1 capsule by mouth 3 (three) times daily as needed. (Patient not taking: Reported on 10/10/2022)        Past Medical History:    Allergic rhinitis    Dysplastic nevus    mildly dysplastic mid back above bra    Dysplastic nevus    mid abdomen; mild to moderated dysplasia    Dysplastic nevus    left abdomen, mild to moderate dysplasia    Dysplastic nevus    right posterior lower leg -  mild dysplasia    Elevated LDL cholesterol level      Past Surgical History:   Procedure Laterality Date    Colonoscopy  2020    normal    Colonoscopy        ,       Family History   Problem Relation Age of Onset    Ovarian Cancer Paternal Grandmother 65    Cancer Paternal Grandmother     Arrhythmia Son     Asthma Son     Heart Disorder Son         SVT WPW    Asthma Brother     Prostate Cancer Brother 60        Dionte 7    Cancer Paternal Aunt 57        colon  ca    Cancer Paternal Aunt 68        colon ca    Breast Cancer Paternal Aunt     Cancer Paternal Cousin Female 78        colon ca      Social History:   Social History     Socioeconomic History    Marital status:    Tobacco Use    Smoking status: Never    Smokeless tobacco: Never   Vaping Use    Vaping status: Never Used   Substance and Sexual Activity    Alcohol use: Yes     Alcohol/week: 4.0 standard drinks of alcohol     Types: 4 Glasses of wine per week     Comment: 2 drinks weekly    Drug use: Never   Other Topics Concern    Caffeine Concern Yes     Comment: Coffee 2 cups daily    Pt has a pacemaker No    Pt has a defibrillator No    Reaction to local anesthetic No          REVIEW OF SYSTEMS:   GENERAL: feels well otherwise  SKIN: denies any unusual skin lesions  EYES:denies blurred vision or double vision  HEENT: denies nasal congestion, sinus pain or ST  LUNGS: denies shortness of breath with exertion or cough  CARDIOVASCULAR: denies chest pain, pressure, or palpitations  GI: denies abdominal pain, nausea, vomiting, diarrhea, constipation, hematochezia, or melena  NEURO: denies headaches or dizziness    EXAM:   /76   Pulse 66   Temp 98 °F (36.7 °C) (Oral)   Resp 16   Ht 5' 2.5\" (1.588 m)   Wt 118 lb (53.5 kg)   LMP 2019 (Approximate)   SpO2 (!) 0%   BMI 21.24 kg/m²     GENERAL: well developed, well nourished, in no apparent distress  HEENT: normal oropharynx, normal TM's; no cerumen  NECK: supple, no  cervical or supraclavicular LAD, no carotid bruits  BREAST: no dominant or suspicious mass, no axillary LAD  LUNGS: clear to auscultation  CARDIO: RRR, normal S1S2, no gallops or murmurs  GI: soft, NT, ND, NABS, no HSM  EXTREMITIES: no cyanosis, clubbing or edema, +2 DP pulses      ASSESSMENT AND PLAN:     Hypercholesterolemia  -Strong family hx of high cholesterol.  ->170; TG and HDL completely normal  -CT calcium score in 4/2022 was zero  -based on 10yr CAD risk, no benefit of statin.      Routine health maintenance  Pap/HPV negative 4/8/21- repeat in 1-2 years  Mammo ordered.  Tdap done 11/2014.  Tdap booster today 4/15/24  Colonoscopy 7/6/20 (Dr. Mckeon); no polyps; repeat in 10 years (2030).  Had Shingrix shingles vaccine      Osteoporosis  -DEXA done 12/2017 -- stable, mild osteopenia.   -Repeat DEXA 3/7/22 -- progression of bone loss -- significant decrease in lumbar spine (T-2.5).  -Pt reluctant to start Fosamax at that time; started taking calcium and Vit D regularly and has been doing weight-bearing exercise.  Will repeat her DEXA this year.     Subclinical hypothyroidism   TSH was normal last year; check repeat.         RTC 1 yr

## 2024-04-15 NOTE — TELEPHONE ENCOUNTER
From: Vianney Castle  To: Jessica Ryanjustine  Sent: 4/15/2024 3:03 PM CDT  Subject: follow up questions    Hi, I'm just following up after reading today's After Visit Summary. A couple of things:  1) albuterol and benzonatate keep showing up on my medications list. I don't take these and only took them for a short time after I had Covid. Can they be removed from my medications list?  2) The After Visit Summary states, under \"Done Today\" that a CBC w/Differential Complete Metabolic Panel was done, which it wasn't. I just want to ensure that is corrected and I won't be billed for it.  Thank you!

## 2024-04-20 ENCOUNTER — HOSPITAL ENCOUNTER (OUTPATIENT)
Dept: BONE DENSITY | Age: 54
Discharge: HOME | End: 2024-04-20
Attending: INTERNAL MEDICINE
Payer: COMMERCIAL

## 2024-04-20 ENCOUNTER — HOSPITAL ENCOUNTER (OUTPATIENT)
Dept: MAMMOGRAPHY | Age: 54
Discharge: HOME | End: 2024-04-20
Attending: INTERNAL MEDICINE
Payer: COMMERCIAL

## 2024-04-20 DIAGNOSIS — Z78.0 POSTMENOPAUSE: ICD-10-CM

## 2024-04-20 DIAGNOSIS — Z12.31 ENCOUNTER FOR SCREENING MAMMOGRAM FOR MALIGNANT NEOPLASM OF BREAST: ICD-10-CM

## 2024-04-20 PROCEDURE — 77080 DXA BONE DENSITY AXIAL: CPT | Performed by: INTERNAL MEDICINE

## 2024-04-20 PROCEDURE — 77067 SCR MAMMO BI INCL CAD: CPT | Performed by: INTERNAL MEDICINE

## 2024-04-20 PROCEDURE — 77063 BREAST TOMOSYNTHESIS BI: CPT | Performed by: INTERNAL MEDICINE

## 2024-04-23 ENCOUNTER — TELEPHONE (OUTPATIENT)
Dept: INTERNAL MEDICINE CLINIC | Facility: CLINIC | Age: 54
End: 2024-04-23

## 2024-04-23 DIAGNOSIS — M81.0 OSTEOPOROSIS, UNSPECIFIED OSTEOPOROSIS TYPE, UNSPECIFIED PATHOLOGICAL FRACTURE PRESENCE: ICD-10-CM

## 2024-04-23 DIAGNOSIS — M81.0 AGE-RELATED OSTEOPOROSIS WITHOUT CURRENT PATHOLOGICAL FRACTURE: Primary | ICD-10-CM

## 2024-04-24 ENCOUNTER — PATIENT MESSAGE (OUTPATIENT)
Dept: INTERNAL MEDICINE CLINIC | Facility: CLINIC | Age: 54
End: 2024-04-24

## 2024-04-24 NOTE — TELEPHONE ENCOUNTER
Please let pt know that her DEXA shows some progression of bone loss in the femur.   I've added an additional blood test to her lab orders.   Please ask her to get her labs done, then schedule a phone visit to review the labs and discuss treatment options for the osteoporosis

## 2024-04-24 NOTE — TELEPHONE ENCOUNTER
Patient called and relayed Dr Barreto' message. Patient verbalized understanding with no further questions noted.

## 2024-04-24 NOTE — TELEPHONE ENCOUNTER
From: Vianney Castle  To: Jessica Barreto  Sent: 4/24/2024 10:38 AM CDT  Subject: Osteoporosis medication    Since my bone loss has progressed and we’re now considering medication, I’m wondering about the affect of osteoporosis meds on my kidneys and if further evaluation of kidney function would be appropriate?

## 2024-04-28 LAB
ABSOLUTE BASOPHILS: 52 CELLS/UL (ref 0–200)
ABSOLUTE EOSINOPHILS: 71 CELLS/UL (ref 15–500)
ABSOLUTE LYMPHOCYTES: 1476 CELLS/UL (ref 850–3900)
ABSOLUTE MONOCYTES: 343 CELLS/UL (ref 200–950)
ABSOLUTE NEUTROPHILS: 2759 CELLS/UL (ref 1500–7800)
ALBUMIN/GLOBULIN RATIO: 1.8 (CALC) (ref 1–2.5)
ALBUMIN: 4.2 G/DL (ref 3.6–5.1)
ALKALINE PHOSPHATASE: 59 U/L (ref 37–153)
ALT: 15 U/L (ref 6–29)
AST: 22 U/L (ref 10–35)
BASOPHILS: 1.1 %
BILIRUBIN, TOTAL: 0.6 MG/DL (ref 0.2–1.2)
BUN: 11 MG/DL (ref 7–25)
CALCIUM: 9.4 MG/DL (ref 8.6–10.4)
CARBON DIOXIDE: 28 MMOL/L (ref 20–32)
CHLORIDE: 98 MMOL/L (ref 98–110)
CHOL/HDLC RATIO: 3.8 (CALC)
CHOLESTEROL, TOTAL: 256 MG/DL
CREATININE: 0.9 MG/DL (ref 0.5–1.03)
EGFR: 75 ML/MIN/1.73M2
EOSINOPHILS: 1.5 %
GLOBULIN: 2.4 G/DL (CALC) (ref 1.9–3.7)
GLUCOSE: 69 MG/DL (ref 65–99)
HDL CHOLESTEROL: 68 MG/DL
HEMATOCRIT: 42.1 % (ref 35–45)
HEMOGLOBIN: 14.1 G/DL (ref 11.7–15.5)
LDL-CHOLESTEROL: 171 MG/DL (CALC)
LYMPHOCYTES: 31.4 %
MCH: 31.2 PG (ref 27–33)
MCHC: 33.5 G/DL (ref 32–36)
MCV: 93.1 FL (ref 80–100)
MONOCYTES: 7.3 %
MPV: 10.6 FL (ref 7.5–12.5)
NEUTROPHILS: 58.7 %
NON-HDL CHOLESTEROL: 188 MG/DL (CALC)
PLATELET COUNT: 248 THOUSAND/UL (ref 140–400)
POTASSIUM: 3.7 MMOL/L (ref 3.5–5.3)
PROTEIN, TOTAL: 6.6 G/DL (ref 6.1–8.1)
RDW: 12.2 % (ref 11–15)
RED BLOOD CELL COUNT: 4.52 MILLION/UL (ref 3.8–5.1)
SODIUM: 138 MMOL/L (ref 135–146)
TRIGLYCERIDES: 71 MG/DL
TSH: 2.15 MIU/L
VITAMIN D, 25-OH, TOTAL: 62 NG/ML (ref 30–100)
WHITE BLOOD CELL COUNT: 4.7 THOUSAND/UL (ref 3.8–10.8)

## 2024-04-29 ENCOUNTER — PATIENT MESSAGE (OUTPATIENT)
Dept: INTERNAL MEDICINE CLINIC | Facility: CLINIC | Age: 54
End: 2024-04-29

## 2024-04-29 NOTE — TELEPHONE ENCOUNTER
From: Vianney Castle  To: Jessica Barreto  Sent: 4/29/2024 11:12 AM CDT  Subject: PTH, INTACT    Hello, I recently completed all of my bloodwork through [facility]. The results recently populated in [email or web address]. However, it appears that the PTH, INTACT is still outstanding. Is that not a blood test? I'm wondering how to get that done. Thank you.

## 2024-04-29 NOTE — TELEPHONE ENCOUNTER
Patient is calling back and states that nurse Peggy told the patient that she was going to call [facility] to see if the blood from the blood draw on 4/26/24 can be used for the PTH, Intact.    Patient is hoping that see does not have to go back to [facility] for another blood draw.    Please advise

## 2024-04-29 NOTE — TELEPHONE ENCOUNTER
Called patient and explained that RN could not reach anyone -order was faxed - instructed patient to call [facility] see if they received order and if it is possible to add it to previous taken blood- verbalized understanding

## 2024-04-29 NOTE — TELEPHONE ENCOUNTER
Order changed to [facility] - tried calling - unable to reach anyone .Faxed order to [facility] at [phone]-confirmation received  Left message to call back.   Please let patient know that order was faxed

## 2024-04-30 ENCOUNTER — PATIENT MESSAGE (OUTPATIENT)
Dept: INTERNAL MEDICINE CLINIC | Facility: CLINIC | Age: 54
End: 2024-04-30

## 2024-05-01 NOTE — TELEPHONE ENCOUNTER
From: Vianney Castle  Sent: 5/1/2024 9:23 AM CDT  To: [facility] Clinical Staff  Subject: labs    Thanks for the update. There is still one lab outstanding that was missed by [facility] the [facility]. According to a nurse in your office, I'm supposed to schedule a telephone call with you after I have that done to discuss the progressing osteoporosis situation and potential treatment, per my recent DEXA. Did I misunderstand that?

## 2024-05-06 LAB — PARATHYROID HORMONE,$INTACT: 22 PG/ML (ref 16–77)

## 2024-05-28 ENCOUNTER — VIRTUAL PHONE E/M (OUTPATIENT)
Dept: INTERNAL MEDICINE CLINIC | Facility: CLINIC | Age: 54
End: 2024-05-28

## 2024-05-28 DIAGNOSIS — M81.0 AGE-RELATED OSTEOPOROSIS WITHOUT CURRENT PATHOLOGICAL FRACTURE: Primary | ICD-10-CM

## 2024-05-28 PROCEDURE — G2252 BRIEF CHKIN BY MD/QHP, 11-20: HCPCS | Performed by: INTERNAL MEDICINE

## 2024-05-28 NOTE — PROGRESS NOTES
Virtual Telephone Check-In    Vianney Castle verbally consents to a Virtual/Telephone Check-In visit on 05/28/24.  Patient has been referred to the [facility] website at [email or web address] to review the yearly Consent to Treat document.    Patient understands and accepts financial responsibility for any deductible, co-insurance and/or co-pays associated with this service.    Duration of the service: 23 minutes      Summary of topics discussed:       Osteoporosis  -DEXA 4/2024 -- T-2.5 left fem neck, T-2.5 spine, T -1.7 hip  -decreased bone density of hip and spine since last DEXA in 3/2022  -PTHi normal (22)  -vitamin D level normal (62)  -gets the recommended calcium daily  -mom w/osteoporosis  -already does regular weight-bearing exercise but moreso for her spine; since seeing her DEXA results, she started doing jumping therapy.  Also started drinking mineral water.  -pt has been reluctant to start medication.  Discussed bisphosphonates; reviewed all potential side effects (rare).  -pt would like to continue the jumping therapy and repeat another DEXA in 6 mos -- she will pay out of pocket.  -she found an outside facility that will do a DEXA for a few hundred dollars.  Discussed that it is difficult to compare different DEXA machines.  She will look into getting a DEXA at that facility now and again in 6 mos      Jessica Barreto MD

## 2024-06-07 ENCOUNTER — PATIENT MESSAGE (OUTPATIENT)
Dept: INTERNAL MEDICINE CLINIC | Facility: CLINIC | Age: 54
End: 2024-06-07

## 2024-06-07 NOTE — TELEPHONE ENCOUNTER
From: Vianney Castle  To: Jessica Barreto  Sent: 6/7/2024 2:47 PM CDT  Subject: Osteoporosis medication    Hi Dr. Barreto,  I'm following up after our phone conversation and have been rethinking holding off on medication to treat my osteoporosis. I think I'd like to start medication now, rather than wait 6 months to see if the jumping therapy and dietary changes are making a difference. Please advise. Thanks!

## 2024-06-11 RX ORDER — ALENDRONATE SODIUM 70 MG/1
70 TABLET ORAL
Qty: 13 TABLET | Refills: 3 | Status: SHIPPED | OUTPATIENT
Start: 2024-06-11

## 2024-08-07 ENCOUNTER — OFFICE VISIT (OUTPATIENT)
Dept: INTERNAL MEDICINE CLINIC | Facility: CLINIC | Age: 54
End: 2024-08-07

## 2024-08-07 VITALS
WEIGHT: 116 LBS | TEMPERATURE: 98 F | OXYGEN SATURATION: 99 % | SYSTOLIC BLOOD PRESSURE: 120 MMHG | DIASTOLIC BLOOD PRESSURE: 76 MMHG | BODY MASS INDEX: 20.81 KG/M2 | HEART RATE: 74 BPM | HEIGHT: 62.5 IN
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
## 2024-08-07 DIAGNOSIS — S40.869A INSECT BITE OF UPPER ARM, UNSPECIFIED LATERALITY, INITIAL ENCOUNTER: Primary | ICD-10-CM

## 2024-08-07 PROCEDURE — 99212 OFFICE O/P EST SF 10 MIN: CPT | Performed by: INTERNAL MEDICINE

## 2024-08-07 NOTE — PROGRESS NOTES
Vianney Castle is a 54 year old female.  Chief Complaint   Patient presents with    Rash     Pt here due to abnormal bug bites x 1 week , not getting any better.      HPI:     2 bug bites -- one on right upper arm, one just anterior to left axilla  +large area of redness around each.  No pain; mild itching  The redness has persisted longer than a normal bug bite      Current Outpatient Medications   Medication Sig Dispense Refill    alendronate 70 MG Oral Tab Take 1 tablet (70 mg total) by mouth every 7 days. 13 tablet 3    Misc Natural Products (BETA-SITOSTEROL PLANT STEROLS OR) Take 2 capsules by mouth 2 (two) times a day.      Tretinoin, Facial Wrinkles, (RENOVA) 0.02 % External Cream Apply to face nightly as tolerated 40 g 6    Calcium Carbonate-Vitamin D 500-400 MG-UNIT Oral Tab Take 1 tablet by mouth daily.        Past Medical History:    Allergic rhinitis    Dysplastic nevus    mildly dysplastic mid back above bra    Dysplastic nevus    mid abdomen; mild to moderated dysplasia    Dysplastic nevus    left abdomen, mild to moderate dysplasia    Dysplastic nevus    right posterior lower leg - mild dysplasia    Elevated LDL cholesterol level      Social History:  Social History     Socioeconomic History    Marital status:    Tobacco Use    Smoking status: Never    Smokeless tobacco: Never   Vaping Use    Vaping status: Never Used   Substance and Sexual Activity    Alcohol use: Yes     Alcohol/week: 4.0 standard drinks of alcohol     Types: 4 Glasses of wine per week     Comment: 2 drinks weekly    Drug use: Never   Other Topics Concern    Caffeine Concern Yes     Comment: Coffee 2 cups daily    Pt has a pacemaker No    Pt has a defibrillator No    Reaction to local anesthetic No        REVIEW OF SYSTEMS:   GENERAL HEALTH: feels well otherwise  RESPIRATORY: no SOB  CARDIOVASCULAR: no chest pain/pressure  GI: no nausea, vomiting, diarrhea    Wt Readings from Last 5 Encounters:   08/07/24 116 lb (52.6 kg)    04/15/24 118 lb (53.5 kg)   04/13/23 117 lb (53.1 kg)   04/12/22 123 lb (55.8 kg)   01/19/22 122 lb (55.3 kg)     Body mass index is 20.88 kg/m².      EXAM:   /76   Pulse 74   Temp 97.6 °F (36.4 °C)   Ht 5' 2.5\" (1.588 m)   Wt 116 lb (52.6 kg)   LMP 01/01/2019 (Approximate)   SpO2 99%   BMI 20.88 kg/m²   GENERAL: well developed, well nourished, in no apparent distress  +approx 6x6cm area of redness just ant to left axilla and +4x4cm area of redness right upper arm, each with a tiny firm central area    ASSESSMENT AND PLAN:     Insect bites  -poss 2/2 cicada mites; d/w pt that mult pts have presented with similar sx in the past week  -no specific treatment needed; can use topical hydrocort for itching    The patient indicates understanding of these issues and agrees to the plan.    Jessica Barreto MD, 08/07/24, 3:00 PM

## 2024-10-11 ENCOUNTER — PATIENT MESSAGE (OUTPATIENT)
Dept: INTERNAL MEDICINE CLINIC | Facility: CLINIC | Age: 54
End: 2024-10-11

## 2024-10-11 DIAGNOSIS — Z12.83 SKIN EXAM, SCREENING FOR CANCER: Primary | ICD-10-CM

## 2025-02-07 ENCOUNTER — PATIENT MESSAGE (OUTPATIENT)
Dept: INTERNAL MEDICINE CLINIC | Facility: CLINIC | Age: 55
End: 2025-02-07

## 2025-03-24 ENCOUNTER — PATIENT MESSAGE (OUTPATIENT)
Dept: INTERNAL MEDICINE CLINIC | Facility: CLINIC | Age: 55
End: 2025-03-24

## 2025-03-24 DIAGNOSIS — E78.00 HYPERCHOLESTEROLEMIA: ICD-10-CM

## 2025-03-24 DIAGNOSIS — E03.8 SUBCLINICAL HYPOTHYROIDISM: ICD-10-CM

## 2025-03-24 DIAGNOSIS — E55.9 VITAMIN D DEFICIENCY: Primary | ICD-10-CM

## 2025-04-11 ENCOUNTER — LAB ENCOUNTER (OUTPATIENT)
Dept: LAB | Age: 55
End: 2025-04-11
Attending: INTERNAL MEDICINE
Payer: COMMERCIAL

## 2025-04-11 DIAGNOSIS — E03.8 SUBCLINICAL HYPOTHYROIDISM: ICD-10-CM

## 2025-04-11 DIAGNOSIS — E78.00 HYPERCHOLESTEROLEMIA: ICD-10-CM

## 2025-04-11 DIAGNOSIS — E55.9 VITAMIN D DEFICIENCY: ICD-10-CM

## 2025-04-11 LAB
ALBUMIN SERPL-MCNC: 4.6 G/DL (ref 3.2–4.8)
ALBUMIN/GLOB SERPL: 2 {RATIO} (ref 1–2)
ALP LIVER SERPL-CCNC: 41 U/L (ref 46–118)
ALT SERPL-CCNC: 19 U/L (ref 10–49)
ANION GAP SERPL CALC-SCNC: 7 MMOL/L (ref 0–18)
AST SERPL-CCNC: 25 U/L (ref ?–34)
BASOPHILS # BLD AUTO: 0.07 X10(3) UL (ref 0–0.2)
BASOPHILS NFR BLD AUTO: 1.4 %
BILIRUB SERPL-MCNC: 0.6 MG/DL (ref 0.3–1.2)
BUN BLD-MCNC: 10 MG/DL (ref 9–23)
BUN/CREAT SERPL: 11.2 (ref 10–20)
CALCIUM BLD-MCNC: 9 MG/DL (ref 8.7–10.4)
CHLORIDE SERPL-SCNC: 103 MMOL/L (ref 98–112)
CHOLEST SERPL-MCNC: 276 MG/DL (ref ?–200)
CO2 SERPL-SCNC: 29 MMOL/L (ref 21–32)
CREAT BLD-MCNC: 0.89 MG/DL (ref 0.55–1.02)
DEPRECATED RDW RBC AUTO: 40.8 FL (ref 35.1–46.3)
EGFRCR SERPLBLD CKD-EPI 2021: 76 ML/MIN/1.73M2 (ref 60–?)
EOSINOPHIL # BLD AUTO: 0.09 X10(3) UL (ref 0–0.7)
EOSINOPHIL NFR BLD AUTO: 1.9 %
ERYTHROCYTE [DISTWIDTH] IN BLOOD BY AUTOMATED COUNT: 11.9 % (ref 11–15)
FASTING PATIENT LIPID ANSWER: YES
FASTING STATUS PATIENT QL REPORTED: YES
GLOBULIN PLAS-MCNC: 2.3 G/DL (ref 2–3.5)
GLUCOSE BLD-MCNC: 86 MG/DL (ref 70–99)
HCT VFR BLD AUTO: 41.8 % (ref 35–48)
HDLC SERPL-MCNC: 73 MG/DL (ref 40–59)
HGB BLD-MCNC: 14.3 G/DL (ref 12–16)
IMM GRANULOCYTES # BLD AUTO: 0 X10(3) UL (ref 0–1)
IMM GRANULOCYTES NFR BLD: 0 %
LDLC SERPL CALC-MCNC: 192 MG/DL (ref ?–100)
LYMPHOCYTES # BLD AUTO: 1.62 X10(3) UL (ref 1–4)
LYMPHOCYTES NFR BLD AUTO: 33.5 %
MCH RBC QN AUTO: 31.6 PG (ref 26–34)
MCHC RBC AUTO-ENTMCNC: 34.2 G/DL (ref 31–37)
MCV RBC AUTO: 92.5 FL (ref 80–100)
MONOCYTES # BLD AUTO: 0.38 X10(3) UL (ref 0.1–1)
MONOCYTES NFR BLD AUTO: 7.9 %
NEUTROPHILS # BLD AUTO: 2.68 X10 (3) UL (ref 1.5–7.7)
NEUTROPHILS # BLD AUTO: 2.68 X10(3) UL (ref 1.5–7.7)
NEUTROPHILS NFR BLD AUTO: 55.3 %
NONHDLC SERPL-MCNC: 203 MG/DL (ref ?–130)
OSMOLALITY SERPL CALC.SUM OF ELEC: 286 MOSM/KG (ref 275–295)
PLATELET # BLD AUTO: 252 10(3)UL (ref 150–450)
POTASSIUM SERPL-SCNC: 4.4 MMOL/L (ref 3.5–5.1)
PROT SERPL-MCNC: 6.9 G/DL (ref 5.7–8.2)
RBC # BLD AUTO: 4.52 X10(6)UL (ref 3.8–5.3)
SODIUM SERPL-SCNC: 139 MMOL/L (ref 136–145)
TRIGL SERPL-MCNC: 70 MG/DL (ref 30–149)
TSI SER-ACNC: 1.66 UIU/ML (ref 0.55–4.78)
VIT D+METAB SERPL-MCNC: 43.6 NG/ML (ref 30–100)
VLDLC SERPL CALC-MCNC: 15 MG/DL (ref 0–30)
WBC # BLD AUTO: 4.8 X10(3) UL (ref 4–11)

## 2025-04-11 PROCEDURE — 80061 LIPID PANEL: CPT

## 2025-04-11 PROCEDURE — 36415 COLL VENOUS BLD VENIPUNCTURE: CPT

## 2025-04-11 PROCEDURE — 80053 COMPREHEN METABOLIC PANEL: CPT

## 2025-04-11 PROCEDURE — 84443 ASSAY THYROID STIM HORMONE: CPT

## 2025-04-11 PROCEDURE — 82306 VITAMIN D 25 HYDROXY: CPT

## 2025-04-11 PROCEDURE — 85025 COMPLETE CBC W/AUTO DIFF WBC: CPT

## 2025-04-17 ENCOUNTER — OFFICE VISIT (OUTPATIENT)
Dept: INTERNAL MEDICINE CLINIC | Facility: CLINIC | Age: 55
End: 2025-04-17
Payer: COMMERCIAL

## 2025-04-17 VITALS
WEIGHT: 122 LBS | OXYGEN SATURATION: 100 % | HEART RATE: 80 BPM | TEMPERATURE: 98 F | SYSTOLIC BLOOD PRESSURE: 120 MMHG | BODY MASS INDEX: 21.89 KG/M2 | HEIGHT: 62.5 IN | DIASTOLIC BLOOD PRESSURE: 68 MMHG

## 2025-04-17 DIAGNOSIS — Z00.00 ROUTINE HEALTH MAINTENANCE: ICD-10-CM

## 2025-04-17 DIAGNOSIS — M81.0 AGE-RELATED OSTEOPOROSIS WITHOUT CURRENT PATHOLOGICAL FRACTURE: ICD-10-CM

## 2025-04-17 DIAGNOSIS — E55.9 VITAMIN D DEFICIENCY: ICD-10-CM

## 2025-04-17 DIAGNOSIS — Z12.31 ENCOUNTER FOR SCREENING MAMMOGRAM FOR MALIGNANT NEOPLASM OF BREAST: Primary | ICD-10-CM

## 2025-04-17 DIAGNOSIS — E78.00 HYPERCHOLESTEROLEMIA: ICD-10-CM

## 2025-04-17 RX ORDER — ESTRADIOL 0.1 MG/G
1 CREAM VAGINAL
Qty: 42 G | Refills: 11 | Status: SHIPPED | OUTPATIENT
Start: 2025-04-17

## 2025-04-17 RX ORDER — ROSUVASTATIN CALCIUM 10 MG/1
10 TABLET, COATED ORAL NIGHTLY
Qty: 90 TABLET | Refills: 3 | Status: SHIPPED | OUTPATIENT
Start: 2025-04-17

## 2025-04-17 RX ORDER — ALENDRONATE SODIUM 70 MG/1
70 TABLET ORAL
Qty: 13 TABLET | Refills: 3 | Status: SHIPPED | OUTPATIENT
Start: 2025-04-17

## 2025-04-17 NOTE — PROGRESS NOTES
Vianney Castle is a 54 year old female.  Chief Complaint   Patient presents with    Physical     Patient is here today for a PE. Last pap exam was negative on 21. She states that she has been feeling good lately. No major issues at this time.        HPI:   Vianney Castle is a 54 year old female who presents for a complete physical exam.     Exercises regularly  Feels well.         FamHx:  Brother dx'ed with prostate cancer at age 60 --genetic testing negative  Other brother with AF at 56  Other brother with MVP and CAD (heavy smoker, EtOH) -- survived the CABG but then  a few months later of hemorrhagic CVA  Dad had MI at 83 in 2023 (nonsmoker)-- survived cardiac arrest (exercises regularly)    SocHx;  Sons are 21 (ruddy at [employer] studying [employer]) and 23 (grad [employer]).      Tinnitus has resolved  Wt Readings from Last 6 Encounters:   25 122 lb (55.3 kg)   24 116 lb (52.6 kg)   04/15/24 118 lb (53.5 kg)   23 117 lb (53.1 kg)   22 123 lb (55.8 kg)   22 122 lb (55.3 kg)     Body mass index is 21.96 kg/m².       Current Outpatient Medications   Medication Sig Dispense Refill    alendronate 70 MG Oral Tab Take 1 tablet (70 mg total) by mouth every 7 days. 13 tablet 3    Misc Natural Products (BETA-SITOSTEROL PLANT STEROLS OR) Take 2 capsules by mouth 2 (two) times a day.      Tretinoin, Facial Wrinkles, (RENOVA) 0.02 % External Cream Apply to face nightly as tolerated 40 g 6    Calcium Carbonate-Vitamin D 500-400 MG-UNIT Oral Tab Take 1 tablet by mouth daily.        Past Medical History:    Allergic rhinitis    Dysplastic nevus    mildly dysplastic mid back above bra    Dysplastic nevus    mid abdomen; mild to moderated dysplasia    Dysplastic nevus    left abdomen, mild to moderate dysplasia    Dysplastic nevus    right posterior lower leg - mild dysplasia    Elevated LDL cholesterol level    Environmental allergies    Scoliosis      Past  Surgical History:   Procedure Laterality Date    Colonoscopy  2020    normal    Colonoscopy        ,       Family History   Problem Relation Age of Onset    Ovarian Cancer Paternal Grandmother 65    Cancer Paternal Grandmother     Arrhythmia Son     Asthma Son     Heart Disorder Son         SVT WPW    Asthma Brother     Heart Disorder Brother         Afib    Heart Disorder Father         Afib, 99% artery blockage    Prostate Cancer Brother 60        [place] 7    Cancer Brother     Cancer Paternal Aunt 57        colon  ca    Cancer Paternal Aunt 68        colon ca    Breast Cancer Paternal Aunt     Cancer Paternal Aunt     Cancer Paternal Cousin Female 78        colon ca    Heart Disorder Brother         Mitral valve prolapse, 90% artery blockage    Heart Disorder Brother         Mitral valve prolapse, 90% artery blockage      Social History:   Social History     Socioeconomic History    Marital status:    Tobacco Use    Smoking status: Never    Smokeless tobacco: Never   Vaping Use    Vaping status: Never Used   Substance and Sexual Activity    Alcohol use: Not Currently     Alcohol/week: 4.0 standard drinks of alcohol     Types: 4 Standard drinks or equivalent per week     Comment: Very Rare    Drug use: Never   Other Topics Concern    Caffeine Concern Yes     Comment: Coffee 2 cups daily    Pt has a pacemaker No    Pt has a defibrillator No    Reaction to local anesthetic No          REVIEW OF SYSTEMS:   GENERAL: feels well otherwise  SKIN: denies any unusual skin lesions  EYES:denies blurred vision or double vision  HEENT: denies nasal congestion, sinus pain or ST  LUNGS: denies shortness of breath with exertion or cough  CARDIOVASCULAR: denies chest pain, pressure, or palpitations  GI: denies abdominal pain, nausea, vomiting, diarrhea, constipation, hematochezia, or melena  NEURO: denies headaches or dizziness    EXAM:   /68 (BP Location: Right arm, Patient Position: Sitting, Cuff  Size: adult)   Pulse 80   Temp 98.4 °F (36.9 °C) (Oral)   Ht 5' 2.5\" (1.588 m)   Wt 122 lb (55.3 kg)   LMP 01/01/2019 (Approximate)   SpO2 100%   BMI 21.96 kg/m²     GENERAL: well developed, well nourished, in no apparent distress  HEENT: normal oropharynx, normal TM's; no cerumen  NECK: supple, no cervical or supraclavicular LAD, no carotid bruits  BREAST: no dominant or suspicious mass, no axillary LAD  LUNGS: clear to auscultation  CARDIO: RRR, normal S1S2, no gallops or murmurs  GI: soft, NT, ND, NABS, no HSM  EXTREMITIES: no cyanosis, clubbing or edema, +2 DP pulses      ASSESSMENT AND PLAN:     Hypercholesterolemia  -Strong family hx of high cholesterol.  -TG and HDL completely normal  -CT calcium score in 4/2022 was zero  -LDL now 170>192  -discussed with pt -- recommend starting rosuvastatin 10 mg/day  -repeat lipids 3 mos      Routine health maintenance  Pap/HPV negative 4/8/21  Mammo ordered.  Tdap booster 4/15/24  Colonoscopy 7/6/20 (Dr. Mckeon); no polyps; repeat in 10 years (2030).  Had Shingrix shingles vaccine   PCV20 today 4/17/25    Osteoporosis  -DEXA 4/2024 -- T-2.5 left fem neck, T-2.5 spine, T -1.7 hip  -decreased bone density of hip and spine since last DEXA in 3/2022  -PTHi normal (22)  -vitamin D level normal (62)  -gets the recommended calcium daily  -mom w/osteoporosis  -on fosamax as of 6/2024         RTC 1 yr

## 2025-04-24 ENCOUNTER — APPOINTMENT (OUTPATIENT)
Dept: GENERAL RADIOLOGY | Age: 55
End: 2025-04-24
Attending: NURSE PRACTITIONER
Payer: COMMERCIAL

## 2025-04-24 ENCOUNTER — OFFICE VISIT (OUTPATIENT)
Dept: FAMILY MEDICINE CLINIC | Facility: CLINIC | Age: 55
End: 2025-04-24
Payer: COMMERCIAL

## 2025-04-24 ENCOUNTER — HOSPITAL ENCOUNTER (OUTPATIENT)
Age: 55
Discharge: HOME | End: 2025-04-24
Payer: COMMERCIAL

## 2025-04-24 VITALS
WEIGHT: 122 LBS | HEIGHT: 62.5 IN | OXYGEN SATURATION: 99 % | TEMPERATURE: 98 F | RESPIRATION RATE: 16 BRPM | DIASTOLIC BLOOD PRESSURE: 84 MMHG | HEART RATE: 89 BPM | BODY MASS INDEX: 21.89 KG/M2 | SYSTOLIC BLOOD PRESSURE: 136 MMHG

## 2025-04-24 VITALS
OXYGEN SATURATION: 100 % | HEART RATE: 100 BPM | RESPIRATION RATE: 18 BRPM | TEMPERATURE: 98 F | DIASTOLIC BLOOD PRESSURE: 80 MMHG | SYSTOLIC BLOOD PRESSURE: 116 MMHG

## 2025-04-24 DIAGNOSIS — J02.9 VIRAL PHARYNGITIS: Primary | ICD-10-CM

## 2025-04-24 DIAGNOSIS — J02.9 SORE THROAT: ICD-10-CM

## 2025-04-24 DIAGNOSIS — J06.9 VIRAL URI WITH COUGH: Primary | ICD-10-CM

## 2025-04-24 DIAGNOSIS — Z01.89 PATIENT REQUESTED DIAGNOSTIC TESTING: ICD-10-CM

## 2025-04-24 LAB
CONTROL LINE PRESENT WITH A CLEAR BACKGROUND (YES/NO): YES YES/NO
KIT LOT #: NORMAL NUMERIC
STREP GRP A CUL-SCR: NEGATIVE

## 2025-04-24 PROCEDURE — 99213 OFFICE O/P EST LOW 20 MIN: CPT | Performed by: NURSE PRACTITIONER

## 2025-04-24 PROCEDURE — 99203 OFFICE O/P NEW LOW 30 MIN: CPT | Performed by: NURSE PRACTITIONER

## 2025-04-24 PROCEDURE — 87081 CULTURE SCREEN ONLY: CPT | Performed by: NURSE PRACTITIONER

## 2025-04-24 PROCEDURE — 3079F DIAST BP 80-89 MM HG: CPT | Performed by: NURSE PRACTITIONER

## 2025-04-24 PROCEDURE — 3075F SYST BP GE 130 - 139MM HG: CPT | Performed by: NURSE PRACTITIONER

## 2025-04-24 PROCEDURE — 3008F BODY MASS INDEX DOCD: CPT | Performed by: NURSE PRACTITIONER

## 2025-04-24 PROCEDURE — 70360 X-RAY EXAM OF NECK: CPT | Performed by: NURSE PRACTITIONER

## 2025-04-24 PROCEDURE — 87880 STREP A ASSAY W/OPTIC: CPT | Performed by: NURSE PRACTITIONER

## 2025-04-24 RX ORDER — BENZONATATE 200 MG/1
200 CAPSULE ORAL 3 TIMES DAILY PRN
Qty: 30 CAPSULE | Refills: 0 | Status: SHIPPED | OUTPATIENT
Start: 2025-04-24

## 2025-04-24 RX ORDER — LIDOCAINE HYDROCHLORIDE 20 MG/ML
5 SOLUTION OROPHARYNGEAL AS NEEDED
Qty: 100 ML | Refills: 0 | Status: SHIPPED | OUTPATIENT
Start: 2025-04-24

## 2025-04-24 RX ORDER — DEXAMETHASONE SODIUM PHOSPHATE 10 MG/ML
10 INJECTION, SOLUTION INTRAMUSCULAR; INTRAVENOUS ONCE
Status: COMPLETED | OUTPATIENT
Start: 2025-04-24 | End: 2025-04-24

## 2025-04-24 NOTE — PATIENT INSTRUCTIONS
Instruction for viral upper respiratory infections:  You have a viral upper respiratory illness (URI), which is another term for the common cold. The virus is contagious during the first 4-5 days. It is spread through the air by coughing, sneezing, or by direct contact (touching your sick child then touching your own eyes, nose, or mouth). Sore throat is a common accompanying symptom. Frequent handwashing will decrease risk of spread. Most viral illnesses resolve within 7 to 14 days with rest and simple home remedies. However, they may sometimes last up to 4 weeks. Expect the cough to gradually worsen the first 4-5 days, then peak and slowly go away. The nasal mucous can become thick, yellow or yellow/green during the last half of the cold (but should not last past day 14 of the cold). Antibiotics will not kill a virus and are not prescribed for this condition.    General comfort measures:  Get rest!  Hydrate! (cold or hot based on comfort). Drink lots of water or other non dehydrating liquids to help with illness. Salty foods, soups and tea can help with throat pain.   Hand washing-use hand  or wash hands frequently, cover your cough or sneeze, do not share towels or drinks with others.  Salt water gargles (1 tsp. Salt in 6 oz lukewarm water): gargle for 2 minutes, repeat every 15 minutes as needed to help decrease swelling and relieve pain.  Use humidified air, steamy showers/baths and use vaporizer in sleeping quarters to keep secretions thin.  Avoid smoking.    Symptom management:    Nasal congestion/Post-nasal-drip: Saline nasal spray to nostrils to help remove drainage or an antihistamine to help dry up drainage.    Sinus congestion/Post-nasal-drip: OTC Nasacort or Flonase (steroid nasal spray) nightly for 2 weeks. May take Sudafed (D) or Sudafed-PE, if not contraindicated (do not take if you have HTN).   Pain/discomfort:  May use Tylenol or Ibuprofen, if not contraindicated.  Cough:  May take  DM-dextromethorophan over the counter (long lasting). Ex: Delsym  Chest congestion:  May take guaifenesin with a lot of water.  Ex:  Plain Mucinex.  Sore throat:  Cepacol lozenges or Chloroseptic throat spray (active ingredient Benzocaine).      Follow up with your PCP in 1-2 weeks if not better.  Follow up in a few days if worsening symptoms. Seek immediate care if inability to swallow or breathe.

## 2025-04-24 NOTE — DISCHARGE INSTRUCTIONS
Please take acetaminophen (Tylenol) 650-1000 mg every 6 hours for fever/pain  OR  Ibuprofen (Motrin, Advil) 600 mg every 6 hours for fever/pain, with food  Warm fluids  Throat drops/lozenges e.g. Halls, Cepacol  Warm salt water gargles (1/2 teaspoon of salt to 8 oz of warm water)  ER for any new/worsening symptoms  See your primary care provider if no improvement in 1 week

## 2025-04-24 NOTE — PROGRESS NOTES
CHIEF COMPLAINT:     Chief Complaint   Patient presents with    Sore Throat     Sx 6 days - Productive cough, scratchy throat, general headache, runny nose, PND  Sx Tuesday - ST  Sx last night - Bilat ear pain, tactile fever, RLQ pain  Denies sinus pressure, chills, body aches, nasal congestion, n/v/d, loss of appetite, SOB, rash  OTC Tylenol PM       HPI:   Vianney Castle is a 54 year old female who presents for upper respiratory symptoms for  6 days. Patient reports sore throat, congestion, dry cough.  Associated symptoms include runny nose, sore throat worsened, hurts to swallow, headache, feverish last night but not measured, ear pressure, slight nausea and stomach ache.  Denies sob, wheezing.  Symptoms have been worsened the past 2 days.  Treating symptoms with tylenol, last dose last night.       Concerned about strep and requests testing.    + hx of COVID; No known COVID exposure  No recent travel but plans on going to [place] for son's graduation and visiting parents.  No large gatherings.      Other conditions:   BMI: Body mass index is 21.96 kg/m².  Pregnant:No  Breastfeeding:No  Heart Disease: No  DM: No  Chronic Lung Disease: NO  Chronic Kidney Disease: No  Liver Disease: No  Immunocompromised: (Transplant, Heme-Onc, Chronic Steroid Use): NO    Current Medications[1]   Past Medical History[2]   Past Surgical History[3]      Short Social Hx on File[4]      REVIEW OF SYSTEMS:   GENERAL: feels well otherwise, decreased appetite  SKIN: no rashes or abnormal skin lesions  HEENT: See HPI  LUNGS: denies shortness of breath or wheezing, See HPI  CARDIOVASCULAR: denies chest pain or palpitations   GI: denies V/C or abdominal pain  NEURO: denies dizziness or lightheadedness    EXAM:   /84   Pulse 89   Temp 97.7 °F (36.5 °C) (Tympanic)   Resp 16   Ht 5' 2.5\" (1.588 m)   Wt 122 lb (55.3 kg)   LMP 01/01/2019 (Approximate)   SpO2 99%   BMI 21.96 kg/m²     Physical Exam  Vitals reviewed.   Constitutional:        General: She is not in acute distress.     Appearance: Normal appearance. She is not ill-appearing.   HENT:      Head: Normocephalic and atraumatic.      Jaw: No trismus.      Right Ear: Tympanic membrane and ear canal normal.      Left Ear: Tympanic membrane and ear canal normal.      Nose: Mucosal edema, congestion and rhinorrhea present. Rhinorrhea is clear.      Right Sinus: No maxillary sinus tenderness or frontal sinus tenderness.      Left Sinus: No maxillary sinus tenderness or frontal sinus tenderness.      Mouth/Throat:      Lips: Pink.      Mouth: Mucous membranes are moist.      Pharynx: Oropharynx is clear. Uvula midline. Postnasal drip present. No posterior oropharyngeal erythema.      Tonsils: No tonsillar exudate. 0 on the right. 0 on the left.   Eyes:      Extraocular Movements: Extraocular movements intact.      Conjunctiva/sclera: Conjunctivae normal.   Neck:      Thyroid: No thyromegaly or thyroid tenderness.   Cardiovascular:      Rate and Rhythm: Normal rate and regular rhythm.      Heart sounds: Normal heart sounds. No murmur heard.  Pulmonary:      Effort: Pulmonary effort is normal.      Breath sounds: Normal breath sounds and air entry. No decreased breath sounds, wheezing, rhonchi or rales.      Comments: Dry cough noted.  Abdominal:      General: Bowel sounds are normal. There is no distension.      Palpations: Abdomen is soft. There is no hepatomegaly or splenomegaly.      Tenderness: There is no abdominal tenderness.   Musculoskeletal:      Cervical back: Normal range of motion and neck supple.   Lymphadenopathy:      Cervical: No cervical adenopathy.   Skin:     General: Skin is warm and dry.      Findings: No rash.   Neurological:      General: No focal deficit present.      Mental Status: She is alert.   Psychiatric:         Mood and Affect: Mood is anxious.         Speech: Speech normal.         Behavior: Behavior normal. Behavior is cooperative.          Recent Results (from  the past 24 hours)   Strep A Assay W/Optic    Collection Time: 04/24/25  8:37 AM   Result Value Ref Range    Strep Grp A Screen Negative Negative    Control Line Present with a clear background (yes/no) Yes Yes/No    Kit Lot # [id number] Numeric    Kit Expiration Date 6/3/2026 Date       ASSESSMENT AND PLAN:   Vianney Castle is a 54 year old female who presents with     ASSESSMENT:   Encounter Diagnoses   Name Primary?    Viral URI with cough Yes    Sore throat     Patient requested diagnostic testing        PLAN:   Rapid covid negative. Will send throat culture.  Discussed likely viral etiology. Declined covid testing.  Reviewed symptom relief measures with patient.   Monitor for worsening symptoms.  Comfort care as described in Patient Instructions  Medications as below.      Meds & Refills for this Visit:  Requested Prescriptions     Signed Prescriptions Disp Refills    Lidocaine Viscous HCl 2 % Mouth/Throat Solution 100 mL 0     Sig: Take 5 mL by mouth as needed for Pain (May gargle with every 4 hours if needed).    benzonatate 200 MG Oral Cap 30 capsule 0     Sig: Take 1 capsule (200 mg total) by mouth 3 (three) times daily as needed for cough.       Risks, benefits, and side effects of medication explained and discussed.  To f/u with PCP if sx do not resolve as anticipated.  The patient indicates understanding of these issues and agrees to the plan.        Patient Instructions       Instruction for viral upper respiratory infections:  You have a viral upper respiratory illness (URI), which is another term for the common cold. The virus is contagious during the first 4-5 days. It is spread through the air by coughing, sneezing, or by direct contact (touching your sick child then touching your own eyes, nose, or mouth). Sore throat is a common accompanying symptom. Frequent handwashing will decrease risk of spread. Most viral illnesses resolve within 7 to 14 days with rest and simple home remedies. However, they  may sometimes last up to 4 weeks. Expect the cough to gradually worsen the first 4-5 days, then peak and slowly go away. The nasal mucous can become thick, yellow or yellow/green during the last half of the cold (but should not last past day 14 of the cold). Antibiotics will not kill a virus and are not prescribed for this condition.    General comfort measures:  Get rest!  Hydrate! (cold or hot based on comfort). Drink lots of water or other non dehydrating liquids to help with illness. Salty foods, soups and tea can help with throat pain.   Hand washing-use hand  or wash hands frequently, cover your cough or sneeze, do not share towels or drinks with others.  Salt water gargles (1 tsp. Salt in 6 oz lukewarm water): gargle for 2 minutes, repeat every 15 minutes as needed to help decrease swelling and relieve pain.  Use humidified air, steamy showers/baths and use vaporizer in sleeping quarters to keep secretions thin.  Avoid smoking.    Symptom management:    Nasal congestion/Post-nasal-drip: Saline nasal spray to nostrils to help remove drainage or an antihistamine to help dry up drainage.    Sinus congestion/Post-nasal-drip: OTC Nasacort or Flonase (steroid nasal spray) nightly for 2 weeks. May take Sudafed (D) or Sudafed-PE, if not contraindicated (do not take if you have HTN).   Pain/discomfort:  May use Tylenol or Ibuprofen, if not contraindicated.  Cough:  May take DM-dextromethorophan over the counter (long lasting). Ex: Delsym  Chest congestion:  May take guaifenesin with a lot of water.  Ex:  Plain Mucinex.  Sore throat:  Cepacol lozenges or Chloroseptic throat spray (active ingredient Benzocaine).      Follow up with your PCP in 1-2 weeks if not better.  Follow up in a few days if worsening symptoms. Seek immediate care if inability to swallow or breathe.                          [1]   Current Outpatient Medications   Medication Sig Dispense Refill    alendronate 70 MG Oral Tab Take 1 tablet (70  mg total) by mouth every 7 days. 13 tablet 3    estradiol 0.1 MG/GM Vaginal Cream Place 1 g vaginally twice a week. Start using 1g daily x the first 2 weeks, then use 1g twice weekly thereafter 42 g 11    Misc Natural Products (BETA-SITOSTEROL PLANT STEROLS OR) Take 2 capsules by mouth 2 (two) times a day.      Tretinoin, Facial Wrinkles, (RENOVA) 0.02 % External Cream Apply to face nightly as tolerated 40 g 6    Calcium Carbonate-Vitamin D 500-400 MG-UNIT Oral Tab Take 1 tablet by mouth daily.      rosuvastatin 10 MG Oral Tab Take 1 tablet (10 mg total) by mouth nightly. 90 tablet 3   [2]   Past Medical History:   Allergic rhinitis    Dysplastic nevus    mildly dysplastic mid back above bra    Dysplastic nevus    mid abdomen; mild to moderated dysplasia    Dysplastic nevus    left abdomen, mild to moderate dysplasia    Dysplastic nevus    right posterior lower leg - mild dysplasia    Elevated LDL cholesterol level    Environmental allergies    Scoliosis   [3]   Past Surgical History:  Procedure Laterality Date    Colonoscopy  2020    normal    Colonoscopy        ,    [4]   Social History  Socioeconomic History    Marital status:    Tobacco Use    Smoking status: Never    Smokeless tobacco: Never   Vaping Use    Vaping status: Never Used   Substance and Sexual Activity    Alcohol use: Not Currently     Alcohol/week: 4.0 standard drinks of alcohol     Types: 4 Standard drinks or equivalent per week     Comment: Very Rare    Drug use: Never   Other Topics Concern    Caffeine Concern Yes     Comment: Coffee 2 cups daily    Pt has a pacemaker No    Pt has a defibrillator No    Reaction to local anesthetic No

## 2025-04-24 NOTE — ED PROVIDER NOTES
Chief Complaint   Patient presents with    Sore Throat       HPI:     Vianney Castle is a 54 year old female who presents for evaluation and management of a chief complaint of sore throat ongoing for 2 days.  Was seen at the walk-in clinic this morning, prescribed Tessalon Perles and lidocaine viscous for sore throat, rapid strep test was negative.  Went home, took a nap, woke up out of her sleep with throat constriction, felt like she could not breathe for about 3 minutes, that has since dissipated.  However still feels a tightness in the throat.  She has had no chest pain or shortness of breath.  No nausea, vomiting, diarrhea, or abdominal pain.  No rash.    PFSH  PFSH asessment screens reviewed and agree.  Nursing note reviewed and I agree with documentation.    Family History[1]  Family history reviewed with patient/caregiver and is not pertinent to presenting problem.  Social History     Socioeconomic History    Marital status:      Spouse name: Not on file    Number of children: Not on file    Years of education: Not on file    Highest education level: Not on file   Occupational History    Not on file   Tobacco Use    Smoking status: Never    Smokeless tobacco: Never   Vaping Use    Vaping status: Never Used   Substance and Sexual Activity    Alcohol use: Not Currently     Alcohol/week: 4.0 standard drinks of alcohol     Types: 4 Standard drinks or equivalent per week     Comment: Very Rare    Drug use: Never    Sexual activity: Not on file   Other Topics Concern     Service Not Asked    Blood Transfusions Not Asked    Caffeine Concern Yes     Comment: Coffee 2 cups daily    Occupational Exposure Not Asked    Hobby Hazards Not Asked    Sleep Concern Not Asked    Stress Concern Not Asked    Weight Concern Not Asked    Special Diet Not Asked    Back Care Not Asked    Exercise Not Asked    Bike Helmet Not Asked    Seat Belt Not Asked    Self-Exams Not Asked    Grew up on a farm Not Asked    History  of tanning Not Asked    Outdoor occupation Not Asked    Pt has a pacemaker No    Pt has a defibrillator No    Breast feeding Not Asked    Reaction to local anesthetic No   Social History Narrative    Not on file     Social Drivers of Health     Food Insecurity: Not on file   Transportation Needs: Not on file   Housing Stability: Not on file        Findings:    /80   Pulse 100   Temp 97.9 °F (36.6 °C) (Oral)   Resp 18   LMP 01/01/2019 (Approximate)   SpO2 100%   GENERAL: well developed, well nourished, well hydrated, no distress  HEAD: normocephalic  NECK: supple, no adenopathy  EYES: sclera non icteric bilateral, conjunctiva clear  EARS: TM  bilateral: normal and external auditory canals clear  NOSE: nasal turbinates: pink, normal mucosa  THROAT: clear, without exudates.  No tonsil hypertrophy.  Uvula midline.  Patent oropharynx.  CARDIO: RRR without murmur  LUNGS: clear to auscultation bilaterally; no rales, rhonchi, or wheezes  SKIN: good skin turgor, no obvious rashes    MDM/Assessment/Plan:   Orders for this encounter:  Orders Placed This Encounter    XR SOFT TISSUE NECK (CPT=70360)     What is the Relevant Clinical Indication / Reason for Exam?:   Rule out epiglottitis     Release to patient:   Immediate    dexamethasone PF (Decadron) 10 mg/mL vial as ORAL solution 10 mg       Labs performed this visit:  Recent Results (from the past 10 hours)   Strep A Assay W/Optic    Collection Time: 04/24/25  8:37 AM   Result Value Ref Range    Strep Grp A Screen Negative Negative    Control Line Present with a clear background (yes/no) Yes Yes/No    Kit Lot # [id number] Numeric    Kit Expiration Date 6/3/2026 Date       MDM:   Medical Decision Making  Differentials include: Strep pharyngitis, viral pharyngitis, peritonsillar abscess, epiglottitis versus other    HPI and exam consistent with viral pharyngitis.  Strep test negative today at [facility].  I did order an x-ray of the neck and soft tissue to rule out  epiglottitis, that is normal, no sign of epiglottitis.  Patient is tolerating p.o., no sign of peritonsillar abscess on exam.  Patient is healthy appearing, normal vitals, patent oropharynx.  Discussed supportive care including Tylenol, Ibuprofen, fluids, cepacol. Decadron 10 mg po given in clinic. ER precautions were discussed.  Advised follow-up with primary care provider if no improvement in 1 week. Patient verbalized understanding and agreeable to plan of care.    Case discussed with Dr. Melissa Barrera     Amount and/or Complexity of Data Reviewed  Radiology: ordered and independent interpretation performed. Decision-making details documented in ED Course.     Details: I reviewed x-ray of the neck and soft tissues: No epiglottitis. Verfiied with Dr. Gaurang Jordan, should read \"The prevertebral soft tissues demonstrate NORMAL thickness,\" he will addend.     Risk  OTC drugs.          Diagnosis:    ICD-10-CM    1. Viral pharyngitis  J02.9       2. Sore throat  J02.9 XR SOFT TISSUE NECK (CPT=70360)     XR SOFT TISSUE NECK (CPT=70360)          All results reviewed and discussed with patient.  See AVS for detailed discharge instructions for your condition today.    Follow Up with:  No follow-up provider specified.         [1]   Family History  Problem Relation Age of Onset    Ovarian Cancer Paternal Grandmother 65    Cancer Paternal Grandmother     Arrhythmia Son     Asthma Son     Heart Disorder Son         SVT WPW    Asthma Brother     Heart Disorder Brother         Afib    Heart Disorder Father         Afib, 99% artery blockage    Prostate Cancer Brother 60        [place] 7    Cancer Brother     Cancer Paternal Aunt 57        colon  ca    Cancer Paternal Aunt 68        colon ca    Breast Cancer Paternal Aunt     Cancer Paternal Aunt     Cancer Paternal Cousin Female 78        colon ca    Heart Disorder Brother         Mitral valve prolapse, 90% artery blockage    Heart Disorder Brother         Mitral valve prolapse,  90% artery blockage

## 2025-04-24 NOTE — ED INITIAL ASSESSMENT (HPI)
Symptoms started Tuesday that getting worse. Unknown for fever. Seen walk in clinic today for same problem Strep test done with negative result. Per pt. Had \"acute episode\" of respiratory distress.

## 2025-05-12 ENCOUNTER — OFFICE VISIT (OUTPATIENT)
Dept: DERMATOLOGY CLINIC | Facility: CLINIC | Age: 55
End: 2025-05-12
Payer: COMMERCIAL

## 2025-05-12 DIAGNOSIS — D48.5 NEOPLASM OF UNCERTAIN BEHAVIOR OF SKIN: Primary | ICD-10-CM

## 2025-05-12 DIAGNOSIS — Z13.89 ENCOUNTER FOR SURVEILLANCE OF ABNORMAL NEVI: ICD-10-CM

## 2025-05-12 DIAGNOSIS — D22.9 MULTIPLE NEVI: ICD-10-CM

## 2025-05-12 DIAGNOSIS — L81.4 LENTIGO: ICD-10-CM

## 2025-05-12 DIAGNOSIS — L57.0 AK (ACTINIC KERATOSIS): ICD-10-CM

## 2025-05-12 DIAGNOSIS — L56.5 DSAP (DISSEMINATED SUPERFICIAL ACTINIC POROKERATOSIS): ICD-10-CM

## 2025-05-12 DIAGNOSIS — Z86.018 HISTORY OF DYSPLASTIC NEVUS: ICD-10-CM

## 2025-05-12 DIAGNOSIS — L82.1 SK (SEBORRHEIC KERATOSIS): ICD-10-CM

## 2025-05-12 DIAGNOSIS — D23.9 BENIGN NEOPLASM OF SKIN, UNSPECIFIED LOCATION: ICD-10-CM

## 2025-05-12 PROCEDURE — 88305 TISSUE EXAM BY PATHOLOGIST: CPT | Performed by: DERMATOLOGY

## 2025-05-15 NOTE — PROGRESS NOTES
The pathology report from last visit showed     left lateral upper back, shave biopsy:  - Solar lentigo/early evolving seborrheic keratosis  Please log in test results, send biopsy results letter.  Pt to rtc 1 year or prn.

## 2025-05-18 NOTE — PROGRESS NOTES
Vianney Castle is a 54 year old female.  HPI:     CC:    Chief Complaint   Patient presents with    Full Skin Exam     LOV 10/10/22  Pt presents for FBSE  Denies any concerns at this time.   Denies personal Hx of skin cancer. Has family Hx(Mother) of unknown type.         Allergies:  Patient has no known allergies.    HISTORY:    Past Medical History:    Allergic rhinitis    Dysplastic nevus    mildly dysplastic mid back above bra    Dysplastic nevus    mid abdomen; mild to moderated dysplasia    Dysplastic nevus    left abdomen, mild to moderate dysplasia    Dysplastic nevus    right posterior lower leg - mild dysplasia    Elevated LDL cholesterol level    Environmental allergies    Lentigo    lentigo/sk left lateral upper back    Scoliosis      Past Surgical History:   Procedure Laterality Date    Colonoscopy  2020    normal    Colonoscopy        ,       Family History   Problem Relation Age of Onset    Ovarian Cancer Paternal Grandmother 65    Cancer Paternal Grandmother     Arrhythmia Son     Asthma Son     Heart Disorder Son         SVT WPW    Asthma Brother     Heart Disorder Brother         Afib    Heart Disorder Father         Afib, 99% artery blockage    Prostate Cancer Brother 60        [place] 7    Cancer Brother     Cancer Paternal Aunt 57        colon  ca    Cancer Paternal Aunt 68        colon ca    Breast Cancer Paternal Aunt     Cancer Paternal Aunt     Cancer Paternal Cousin Female 78        colon ca    Heart Disorder Brother         Mitral valve prolapse, 90% artery blockage    Heart Disorder Brother         Mitral valve prolapse, 90% artery blockage      Social History     Socioeconomic History    Marital status:    Tobacco Use    Smoking status: Never    Smokeless tobacco: Never   Vaping Use    Vaping status: Never Used   Substance and Sexual Activity    Alcohol use: Not Currently     Alcohol/week: 4.0 standard drinks of alcohol     Types: 4 Standard drinks or equivalent  per week     Comment: Very Rare    Drug use: Never   Other Topics Concern    Caffeine Concern Yes     Comment: Coffee 2 cups daily    History of tanning Yes    Pt has a pacemaker No    Pt has a defibrillator No    Reaction to local anesthetic No        Current Outpatient Medications   Medication Sig Dispense Refill    rosuvastatin 10 MG Oral Tab Take 1 tablet (10 mg total) by mouth nightly. 90 tablet 3    alendronate 70 MG Oral Tab Take 1 tablet (70 mg total) by mouth every 7 days. 13 tablet 3    estradiol 0.1 MG/GM Vaginal Cream Place 1 g vaginally twice a week. Start using 1g daily x the first 2 weeks, then use 1g twice weekly thereafter 42 g 11    Tretinoin, Facial Wrinkles, (RENOVA) 0.02 % External Cream Apply to face nightly as tolerated 40 g 6    Calcium Carbonate-Vitamin D 500-400 MG-UNIT Oral Tab Take 1 tablet by mouth daily.      Lidocaine Viscous HCl 2 % Mouth/Throat Solution Take 5 mL by mouth as needed for Pain (May gargle with every 4 hours if needed). 100 mL 0    benzonatate 200 MG Oral Cap Take 1 capsule (200 mg total) by mouth 3 (three) times daily as needed for cough. 30 capsule 0    Misc Natural Products (BETA-SITOSTEROL PLANT STEROLS OR) Take 2 capsules by mouth 2 (two) times a day.       Allergies:   No Known Allergies    Past Medical History:    Allergic rhinitis    Dysplastic nevus    mildly dysplastic mid back above bra    Dysplastic nevus    mid abdomen; mild to moderated dysplasia    Dysplastic nevus    left abdomen, mild to moderate dysplasia    Dysplastic nevus    right posterior lower leg - mild dysplasia    Elevated LDL cholesterol level    Environmental allergies    Lentigo    lentigo/sk left lateral upper back    Scoliosis     Past Surgical History:   Procedure Laterality Date    Colonoscopy  2020    normal    Colonoscopy        ,      Social History     Socioeconomic History    Marital status:      Spouse name: Not on file    Number of children: Not on file     Years of education: Not on file    Highest education level: Not on file   Occupational History    Not on file   Tobacco Use    Smoking status: Never    Smokeless tobacco: Never   Vaping Use    Vaping status: Never Used   Substance and Sexual Activity    Alcohol use: Not Currently     Alcohol/week: 4.0 standard drinks of alcohol     Types: 4 Standard drinks or equivalent per week     Comment: Very Rare    Drug use: Never    Sexual activity: Not on file   Other Topics Concern     Service Not Asked    Blood Transfusions Not Asked    Caffeine Concern Yes     Comment: Coffee 2 cups daily    Occupational Exposure Not Asked    Hobby Hazards Not Asked    Sleep Concern Not Asked    Stress Concern Not Asked    Weight Concern Not Asked    Special Diet Not Asked    Back Care Not Asked    Exercise Not Asked    Bike Helmet Not Asked    Seat Belt Not Asked    Self-Exams Not Asked    Grew up on a farm Not Asked    History of tanning Yes    Outdoor occupation Not Asked    Pt has a pacemaker No    Pt has a defibrillator No    Breast feeding Not Asked    Reaction to local anesthetic No   Social History Narrative    Not on file     Social Drivers of Health     Food Insecurity: Not on file   Transportation Needs: Not on file   Stress: Not on file   Housing Stability: Not on file     Family History   Problem Relation Age of Onset    Ovarian Cancer Paternal Grandmother 65    Cancer Paternal Grandmother     Arrhythmia Son     Asthma Son     Heart Disorder Son         SVT WPW    Asthma Brother     Heart Disorder Brother         Afib    Heart Disorder Father         Afib, 99% artery blockage    Prostate Cancer Brother 60        [place] 7    Cancer Brother     Cancer Paternal Aunt 57        colon  ca    Cancer Paternal Aunt 68        colon ca    Breast Cancer Paternal Aunt     Cancer Paternal Aunt     Cancer Paternal Cousin Female 78        colon ca    Heart Disorder Brother         Mitral valve prolapse, 90% artery blockage     Heart Disorder Brother         Mitral valve prolapse, 90% artery blockage       There were no vitals filed for this visit.    HPI:  Chief Complaint   Patient presents with    Full Skin Exam     LOV 10/10/22  Pt presents for FBSE  Denies any concerns at this time.   Denies personal Hx of skin cancer. Has family Hx(Mother) of unknown type.     Follow-up history of numerous dysplastic nevi.  No particular pigmented lesions changing or new, no particular concerns      History of Present Illness  Vianney Castle is a 54 year old female who presents with concerns about multiple skin lesions and moles.    She has a shiny spot on her arm that she is concerned about, especially given her family history of skin cancer. Her mother has had two skin cancers removed, and her father had a lesion removed from his hands.    She is monitoring a freckly spot on her back, which she describes as having a 'little fuzz around it' and appearing different from before. She is cautious and does not want it to develop into something more serious.    She has noticed a spot on the bottom of her foot that has been enlarging and darkening, although it remains uniform in appearance. She is concerned about the change in size and color.    She has a larger mole below her underwear line and a mole on her right shoulder that often gets irritated when putting on her bra or certain shirts. She has a history of multiple moles and age spots and regularly sees a dermatologist. She wants to have some of these spots removed and would prefer to freeze them off if possible.    She tries to use sunscreen regularly, especially on her forearms, which are often exposed.      Patient presents with concerns above.    Patient has been in their usual state of health.     Past notes/ records and appropriate/relevant lab results including pathology and past body maps reviewed. Including outside notes/ PCP notes as appropriate. Updated and new information noted in current  visit.     ROS:  Denies other relevant systemic complaints.      History, medications, allergies reviewed as noted.       Physical Examination:     Well-developed well-nourished patient alert oriented in no acute distress.  Exam performed, including scalp, head, neck, face,nails, hair, external eyes, including conjunctival mucosa, eyelids, lips external ears , arms, digits,palms.     Multiple light to medium brown, well marginated, uniformly pigmented, macules and papules 6 mm and less scattered on exam. pigmented lesions examined with dermoscopy benign-appearing patterns.     Waxy tannish keratotic papules scattered, cherry-red vascular papules scattered.    See map today's date for lesions noted .  See assessment and plan below for specific lesions.    Otherwise remarkable for lesions as noted on map.    See A/P  below for additional information:    Assessment / plan:    Orders Placed This Encounter   Procedures    Specimen to Pathology, Tissue [[facility] Pt to [facility]]       Meds & Refills for this Visit:  Requested Prescriptions      No prescriptions requested or ordered in this encounter         Encounter Diagnoses   Name Primary?    Neoplasm of uncertain behavior of skin Yes    AK (actinic keratosis)     DSAP (disseminated superficial actinic porokeratosis)     Multiple nevi     Lentigo     Benign neoplasm of skin, unspecified location     SK (seborrheic keratosis)          Physical Exam  SKIN: Freckly spot on skin. Shiny spot on skin, possibly keratosis. Larger lesion on skin, stable. Lesion on foot, uniform, monitored. Triangle of lesions on right shoulder, stable.    Results  PATHOLOGY  Skin biopsy: Benign keratosis    Assessment & Plan  Benign skin lesions  Multiple benign skin lesions are present, including a suspected keratosis on the arm, a larger lesion below the underwear line, and a lesion on the bottom of the foot. The keratosis on the arm may require cryotherapy. The lesion on the foot is enlarging  and darkening but remains uniform and non-concerning. The lesion below the underwear line is non-concerning. She has multiple moles and freckles, with a family history of skin cancer. Monitoring is advised, focusing on the foot lesion, and the arm lesion will be excised for further evaluation.  - Monitor the lesion on the bottom of the foot for changes in size or color.  - Cryo to spot on the right arm actinic keratosis/DSAP changes in the background             Patient with irregular brown patch 1.3cm r/o atypical pigmented lesion left lateral upper back,   Shave/ tangential biopsy performed, operative note and consent in chart further plans pending pathology      Erythematous scaling keratotic papules noted at sites noted on map  Actinic Keratoses.  Precancerous nature discussed. Sun protection, sunscreen/ blocks encouraged Lesions treated with cryo- .  Biopsy if not resolved.    Right arm shiny macule DSA P like changes in the background lentigines in the background monitor carefully      Nevus on right plantar foot monitor    Patient's noted violaceous changes to scar post biopsy mildly dysplastic nevus with clear margins right leg.  Reassurance given May use over-the-counter silicone gel, moisturizers sun protection.  Appropriate for location.  Numerous pigmented lesions over the back chest abdomen arms legs as noted.  In particular patient's light brown slightly speckled over the buttocks, lesion with darker central pigmentation right pubic area lower abdomen, reddish-brown macule at left scapula, right mid back observe.    No other new suspicious lesions.  Continue careful sun protection regular skin checks ideally every 6 months possibly annually.  Recheck area on right leg if necessary no evidence of recurrent pigmentation at this time    No other susupicious lesions on todays  exam.    Please refer to map for specific lesions.  See additional diagnoses.  Pros cons of various therapies, risks benefits  discussed.Pathophysiology discussed with patient.  Therapeutic options reviewed.  See  Medications in grid.  Instructions reviewed at length.    Benign nevi, seborrheic  keratoses, cherry angiomas:  Reassurance regarding other benign skin lesions.    Monitor for new or changing lesions. Signs and symptoms of skin cancer, ABCDE's of melanoma ( additional information available at AAD.org, skincancer.org) Encourage Sunscreen (broad-spectrum, ideally mineral-based-UVA/UVB -SPF 30 or higher) use encouraged, sun protection/sun protective clothing, self exams reviewed Followup as noted RTC ---routine checkup 6 mos -one year or p.r.n.    Encounter Times   Including precharting, reviewing chart, prior notes obtaining history: 10 minutes, medical exam :10 minutes, notes on body map, plan, counseling 10minutes My total time spent caring for the patient on the day of the encounter: 30 minutes     The patient indicates understanding of these issues and agrees to the plan.  The patient is asked to return as noted in follow-up/ above.    This note was generated using Dragon voice recognition software.  Please contact me regarding any confusion resulting from errors in recognition..  Note to patient and family: The 21st Century Cures Act makes medical notes like these available to patients. However, be advised this is a medical document. It is intended as [id number] communication and monitoring of a patient's care needs. It is written in medical language and may contain abbreviations or verbiage that are unfamiliar. It may appear blunt or direct. Medical documents are intended to carry relevant information, facts as evident and the clinical opinion of the practitioner.

## 2025-05-18 NOTE — PROGRESS NOTES
Operative Report                     Shave/  Tangential biopsy     Clinical diagnosis:    Size of lesion:    Location: Patient with irregular brown patch 1.3cm r/o atypical pigmented lesion left lateral upper back,     Procedure:    With patient in appropriate position the skin of the above was scrubbed with alcohol.  Anesthesia was obtained with 1% Xylocaine with epinephrine.  The skin surrounding the lesion was placed under tension and the lesion was incised using a #15 scalpel blade.  The specimen was sent for histopathologic exam.    Hemostasis was obtained with electrocautery/aluminum chloride.  Estimated blood loss less than 2 cc.    Biopsy dressed with Polysporin, bandage.    Pressure dressing:   No    Complications: None    Written instructions given and reviewed with patient    Await pathology    Contact information reviewed.    Procedural physician:  Danelle Byrd MD

## 2025-07-29 ENCOUNTER — LAB ENCOUNTER (OUTPATIENT)
Dept: LAB | Age: 55
End: 2025-07-29
Attending: INTERNAL MEDICINE

## 2025-07-29 DIAGNOSIS — E78.00 HYPERCHOLESTEROLEMIA: ICD-10-CM

## 2025-07-29 LAB
ALT SERPL-CCNC: 27 U/L (ref 10–49)
AST SERPL-CCNC: 27 U/L (ref ?–34)
CHOLEST SERPL-MCNC: 193 MG/DL (ref ?–200)
FASTING PATIENT LIPID ANSWER: YES
HDLC SERPL-MCNC: 76 MG/DL (ref 40–59)
LDLC SERPL CALC-MCNC: 99 MG/DL (ref ?–100)
NONHDLC SERPL-MCNC: 117 MG/DL (ref ?–130)
TRIGL SERPL-MCNC: 105 MG/DL (ref 30–149)
VLDLC SERPL CALC-MCNC: 17 MG/DL (ref 0–30)

## 2025-07-29 PROCEDURE — 36415 COLL VENOUS BLD VENIPUNCTURE: CPT

## 2025-07-29 PROCEDURE — 80061 LIPID PANEL: CPT

## 2025-07-29 PROCEDURE — 84450 TRANSFERASE (AST) (SGOT): CPT

## 2025-07-29 PROCEDURE — 84460 ALANINE AMINO (ALT) (SGPT): CPT

## 2025-08-05 ENCOUNTER — TELEMEDICINE (OUTPATIENT)
Dept: INTERNAL MEDICINE CLINIC | Facility: CLINIC | Age: 55
End: 2025-08-05

## 2025-08-05 DIAGNOSIS — Z00.00 PHYSICAL EXAM, ANNUAL: ICD-10-CM

## 2025-08-05 DIAGNOSIS — E78.00 HYPERCHOLESTEROLEMIA: Primary | ICD-10-CM

## 2025-08-05 DIAGNOSIS — M81.0 AGE-RELATED OSTEOPOROSIS WITHOUT CURRENT PATHOLOGICAL FRACTURE: ICD-10-CM

## 2025-08-05 PROCEDURE — 98005 SYNCH AUDIO-VIDEO EST LOW 20: CPT | Performed by: INTERNAL MEDICINE

## (undated) NOTE — LETTER
6/12/2020              [facility]        [id number] [place] [address] [address] [address]         Dear Armando Rowland,      The report of the Biopsy done on 6/9/2020 shows a Benign Keratosis.   This is a benign (not cancerous) growth, and requires no further treatm

## (undated) NOTE — MR AVS SNAPSHOT
After Visit Summary   4/8/2021    Rogers Shown    MRN: [id number]           Visit Information     Date & Time  4/8/2021  2:30 PM Provider  Checo Dhillon MD [address] [facility]  Phone  [phone] VITAMIN D, 25-HYDROXY [[id number] CUSTOM]  4/8/2021 (Approximate) 4/8/2022    XR DEXA BONE DENSITOMETRY (CPT=77080) [64037 CPT(R)]  4/8/2021 (Approximate) 4/8/2022    ABRAHAM JALEN 2D+3D SCREENING BILAT (CPT=77067/11254) [COMBO CPT(R)]  7/16/2021 (Approximate) 4/8 [facility] ([facility]        [address]     [place]          It is the patient's responsibility to check with and follow their insurance company's guidelines for prior authorization for this test.  You may be held responsible for immediate attention VIDEO VISITS  Average cost  $35*    e-VISTS  Average cost  $35*     SAME DAY APPOINTMENTS   Available at primary care offices    AFTER HOURS CARE  [place]  OFFICE VISIT   Primary Care Providers  Treatment for mild illness or injury that